# Patient Record
Sex: FEMALE | Race: WHITE
[De-identification: names, ages, dates, MRNs, and addresses within clinical notes are randomized per-mention and may not be internally consistent; named-entity substitution may affect disease eponyms.]

---

## 2017-07-18 ENCOUNTER — HOSPITAL ENCOUNTER (EMERGENCY)
Dept: HOSPITAL 11 - JP.ED | Age: 62
Discharge: HOME | End: 2017-07-18
Payer: MEDICARE

## 2017-07-18 VITALS — SYSTOLIC BLOOD PRESSURE: 149 MMHG | DIASTOLIC BLOOD PRESSURE: 78 MMHG

## 2017-07-18 DIAGNOSIS — Z98.890: ICD-10-CM

## 2017-07-18 DIAGNOSIS — S40.022A: Primary | ICD-10-CM

## 2017-07-18 DIAGNOSIS — Z79.899: ICD-10-CM

## 2017-07-18 DIAGNOSIS — F17.210: ICD-10-CM

## 2017-07-18 DIAGNOSIS — I10: ICD-10-CM

## 2017-07-18 DIAGNOSIS — Z88.5: ICD-10-CM

## 2017-07-18 DIAGNOSIS — Z98.51: ICD-10-CM

## 2017-07-18 DIAGNOSIS — Z90.49: ICD-10-CM

## 2017-07-18 DIAGNOSIS — J44.9: ICD-10-CM
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
## 2017-07-18 NOTE — EDM.PDOC
[id number]: REACTION TO BLOOD DRAW


Time Seen by Provider: 07/18/17 14:45


Source of Information: Reports: Patient


History Limitations: Reports: No Limitations





- History of Present Illness


INITIAL COMMENTS - FREE TEXT/NARRATIVE: 





61-year-old female had a blood draw from the left antecubital area yesterday, 

told to recheck if swelling occurs and she has a small amount of swelling so 

came in to have it looked at. She has a doctor's appointment tomorrow. No 

fevers or chills, small amount of soreness with movement of the left fingers 

radiating into the forearm.


Location: Reports: Upper Extremity, Left


Quality: Reports: Ache


Severity: Mild


Associated Symptoms: Reports: Other (Bruises easily, always has it is a chronic 

problem)


  ** left arm


Pain Score (Numeric/FACES): 8





- Related Data


 Allergies











Allergy/AdvReac Type Severity Reaction Status Date / Time


 


codeine Allergy Unknown Hives Verified 07/18/17 14:33











Home Meds: 


 Home Meds





Valsartan [Diovan] 160 mg PO DAILY 10/28/13 [History]


Omeprazole [Prilosec] 2 cap PO DAILY 12/23/15 [History]











Past Medical History


HEENT History: Reports: Other (See Below)


Other HEENT History: wears glasses


Cardiovascular History: Reports: Hypertension


Respiratory History: Reports: COPD


Gastrointestinal History: Reports: Cholelithiasis, GI Bleed


Other Gastrointestinal History: perforated ulcer 2013


OB/GYN History: Reports: Pregnancy


Musculoskeletal History: Reports: Back Pain, Chronic


Psychiatric History: Reports: Addiction


Hematologic History: Reports: Blood Transfusion(s)





- Infectious Disease History


Infectious Disease History: Reports: Chicken Pox, Measles, Mumps





- Past Surgical History


HEENT Surgical History: Reports: Oral Surgery


Cardiovascular Surgical History: Reports: None


Respiratory Surgical History: Reports: None


GI Surgical History: Reports: Cholecystectomy, Hernia, Abdominal


Female  Surgical History: Reports: Tubal Ligation


Musculoskeletal Surgical History: Reports: None





Social & Family History





- Family History


HEENT: Reports: Macular Degeneration


Other HEENT Family History: mother


Cardiac: Reports: Hypertension


Other Cardiac Family History: both parents had hypertension


Respiratory: Reports: COPD


Other Respiratory Family Hisory: father had emphysema


Endocrine/Metabolic: Reports: Diabetes, type II





- Tobacco Use


Smoking Status *Q: Current Every Day Smoker


Years of Tobacco use: 40


Packs/Tins Daily: 1


Used Tobacco, but Quit: No


Month Tobacco Last Used: last 6 days ago


Second Hand Smoke Exposure: Yes





- Alcohol Use


Days Per Week of Alcohol Use: 2


Number of Drinks Per Day: 5


Total Drinks Per Week: 10


Date of Last Drink: 06/27/17





- Recreational Drug Use


Recreational Drug Use: No





ED ROS GENERAL





- Review of Systems


Review Of Systems: See Below


Constitutional: Denies: Fever, Chills


Respiratory: Denies: Shortness of Breath


GI/Abdominal: Denies: Nausea, Vomiting


Skin: Reports: Bruising


Neurological: Reports: No Symptoms





ED EXAM, SKIN/RASH


Exam: See Below


Exam Limited By: No Limitations


General Appearance: Alert, No Apparent Distress


Respiratory/Chest: No Respiratory Distress, Lungs Clear


Cardiovascular: Regular Rate, Rhythm


Extremities: Other (Exam is otherwise limited to the left arm. The patient has 

a small bruise in the antecubital area with a small amount of swelling and 

tenderness. No limited range of motion)





Course





- Vital Signs


Last Recorded V/S: 


 Last Vital Signs











Temp  95.9 F   07/18/17 14:30


 


Pulse  100   07/18/17 14:30


 


Resp  18   07/18/17 14:30


 


BP  149/78 H  07/18/17 14:30


 


Pulse Ox  96   07/18/17 14:30














- Re-Assessments/Exams


Free Text/Narrative Re-Assessment/Exam: 





07/18/17 14:52


Patient was reassured that she has a small but not uncommon hematoma from the 

blood draw. I gave her a 2 inch Ace wrap to apply around the elbow for support, 

she did ice it yesterday and she can continue icing today. Recheck with her 

primary caretaker tomorrow as scheduled





Departure





- Departure


Time of Disposition: 15:02


Disposition: Home, Self-Care 01


Condition: Good


Clinical Impression: 


Traumatic hematoma of upper arm


Qualifiers:


 Encounter type: initial encounter Laterality: left Qualified Code(s): S40.022A 

- Contusion of left upper arm, initial encounter








- Discharge Information


Instructions:  Hematoma, Easy-to-Read


Referrals: 


Anna Alarcon MD [Primary Care Provider] - 


Forms:  ED Department Discharge


Care Plan Goals: 


Use Ace wrap to provide gentle pressure on the area and continue icing for 

another day. Recheck tomorrow as scheduled.

## 2018-03-01 ENCOUNTER — HOSPITAL ENCOUNTER (EMERGENCY)
Dept: HOSPITAL 11 - JP.ED | Age: 63
Discharge: HOME | End: 2018-03-01
Payer: MEDICARE

## 2018-03-01 VITALS — DIASTOLIC BLOOD PRESSURE: 81 MMHG | SYSTOLIC BLOOD PRESSURE: 152 MMHG

## 2018-03-01 DIAGNOSIS — I10: ICD-10-CM

## 2018-03-01 DIAGNOSIS — J44.9: ICD-10-CM

## 2018-03-01 DIAGNOSIS — Z88.8: ICD-10-CM

## 2018-03-01 DIAGNOSIS — Z88.5: ICD-10-CM

## 2018-03-01 DIAGNOSIS — Z79.899: ICD-10-CM

## 2018-03-01 DIAGNOSIS — S93.491A: Primary | ICD-10-CM
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
## 2018-03-01 DIAGNOSIS — F17.210: ICD-10-CM

## 2018-03-01 NOTE — EDM.PDOC
ED HPI GENERAL MEDICAL PROBLEM





- General


Chief Complaint: Lower Extremity Injury/Pain


Stated Complaint: ROLLED RIGHT ANKLE


Time Seen by Provider: 03/01/18 04:20


Source of Information: Reports: Patient


History Limitations: Reports: No Limitations





- History of Present Illness


INITIAL COMMENTS - FREE TEXT/NARRATIVE: 





62-year-old female who rolled her ankle a week ago, she iced it initially and 

it seemed to improve for a couple of days but over the last 4 days it's gotten 

worse, has swelled more and keeping her up at night. It's painful to bear 

weight.


Onset: Sudden


Duration: Week(s): (One week)


Location: Reports: Lower Extremity, Right


Severity: Mild


Associated Symptoms: Reports: No Other Symptoms


  ** right ankle


Pain Score (Numeric/FACES): 9





- Related Data


 Allergies











Allergy/AdvReac Type Severity Reaction Status Date / Time


 


codeine Allergy Unknown Hives Verified 07/18/17 14:33


 


cortisone Allergy  Hives Verified 03/01/18 04:10











Home Meds: 


 Home Meds





Valsartan [Diovan] 160 mg PO DAILY 10/28/13 [History]


Omeprazole [Prilosec] 1 cap PO DAILY 12/23/15 [History]











Past Medical History


HEENT History: Reports: Cataract, Other (See Below)


Other HEENT History: wears glasses


Cardiovascular History: Reports: Hypertension


Respiratory History: Reports: COPD


Gastrointestinal History: Reports: Cholelithiasis, GI Bleed


Other Gastrointestinal History: perforated ulcer 2013


OB/GYN History: Reports: Pregnancy


Musculoskeletal History: Reports: Arthritis, Back Pain, Chronic


Psychiatric History: Reports: Addiction


Hematologic History: Reports: Blood Transfusion(s)





- Infectious Disease History


Infectious Disease History: Reports: Chicken Pox, Measles, Mumps





- Past Surgical History


HEENT Surgical History: Reports: Cataract Surgery, Oral Surgery


GI Surgical History: Reports: Cholecystectomy, Hernia, Abdominal


Female  Surgical History: Reports: Tubal Ligation


Musculoskeletal Surgical History: Reports: Arthroscopic Knee





Social & Family History





- Family History


HEENT: Reports: Macular Degeneration


Other HEENT Family History: mother


Cardiac: Reports: Hypertension


Other Cardiac Family History: both parents had hypertension


Respiratory: Reports: COPD


Other Respiratory Family Hisory: father had emphysema


Endocrine/Metabolic: Reports: Diabetes, type II





- Tobacco Use


Smoking Status *Q: Current Every Day Smoker


Years of Tobacco use: 42


Packs/Tins Daily: 1


Used Tobacco, but Quit: No


Month Tobacco Last Used: last 6 days ago


Second Hand Smoke Exposure: Yes





- Caffeine Use


Caffeine Use: Reports: Coffee





- Alcohol Use


Days Per Week of Alcohol Use: 4


Number of Drinks Per Day: 3


Total Drinks Per Week: 12





- Recreational Drug Use


Recreational Drug Use: No





Review of Systems





- Review of Systems


Review Of Systems: See Below


Constitutional: Denies: Fever


Respiratory: Denies: Shortness of Breath (COPD is under good control)


Cardiovascular: Denies: Chest Pain


Genitourinary: Reports: No Symptoms


Skin: Denies: Bruising (Swelling around the ankle but no significant bruising)


Neurological: Reports: No Symptoms.  Denies: Headache





ED EXAM, GENERAL





- Physical Exam


Exam: See Below


Exam Limited By: No Limitations


General Appearance: Alert, No Apparent Distress


Respiratory/Chest: No Respiratory Distress


Cardiovascular: Regular Rate, Rhythm


Extremities: Other (Exam is otherwise limited to the lower extremities. The 

right ankle has significant swelling laterally, and the patient is very tender 

over the distal fibula. There is no peripheral edema of the lower legs 

bilaterally.)





Course





- Vital Signs


Last Recorded V/S: 


 Last Vital Signs











Temp  96.2 F   03/01/18 04:12


 


Pulse  88   03/01/18 04:12


 


Resp  18   03/01/18 04:12


 


BP  152/81 H  03/01/18 04:12


 


Pulse Ox  97   03/01/18 04:12














- Orders/Labs/Meds


Orders: 


 Active Orders 24 hr











 Category Date Time Status


 


 Ankle Min 3V Rt [CR] Stat Exams  03/01/18 04:23 Taken














- Re-Assessments/Exams


Free Text/Narrative Re-Assessment/Exam: 





03/01/18 04:32


An x-ray of the ankle was obtained.


03/01/18 04:44


X-ray showed osteoporosis and old arthritic changes but no acute fracture. A 

four-inch Ace wrap was applied to the foot and ankle and the patient was 

encouraged to increase activity as tolerated. She can see Dr. Arriaza on Monday 

if not improving satisfactorily.





Departure





- Departure


Time of Disposition: 05:00


Disposition: Home, Self-Care 01


Condition: Good


Clinical Impression: 


Right ankle sprain


Qualifiers:


 Encounter type: initial encounter Involved ligament of ankle: anterior 

talofibular ligament Qualified Code(s): S93.491A - Sprain of other ligament of 

right ankle, initial encounter








- Discharge Information


Instructions:  Ankle Sprain, Easy-to-Read


Referrals: 


Anna Alarcon MD [Primary Care Provider] - 


Forms:  ED Department Discharge


Care Plan Goals: 


Continue wrapping to reduce swelling and support ankle for the next 4-6 days. 

Increase activity as tolerated and a regular dose of ibuprofen or naproxen 

should help. Recheck at the clinic with Dr. Arriaza on Monday if not improving 

satisfactorily.





- My Orders


Last 24 Hours: 


My Active Orders





03/01/18 04:23


Ankle Min 3V Rt [CR] Stat 














- Assessment/Plan


Last 24 Hours: 


My Active Orders





03/01/18 04:23


Ankle Min 3V Rt [CR] Stat

## 2018-03-01 NOTE — CR
Diffuse osteopenia. Distal clavicular avulsion fragments or accessory ossicles may be remote. No defi
nitive acute fracture.

## 2018-03-07 ENCOUNTER — HOSPITAL ENCOUNTER (EMERGENCY)
Dept: HOSPITAL 11 - JP.ED | Age: 63
Discharge: HOME | End: 2018-03-07
Payer: MEDICARE

## 2018-03-07 VITALS — DIASTOLIC BLOOD PRESSURE: 89 MMHG | SYSTOLIC BLOOD PRESSURE: 162 MMHG

## 2018-03-07 DIAGNOSIS — F17.210: ICD-10-CM

## 2018-03-07 DIAGNOSIS — Z88.5: ICD-10-CM

## 2018-03-07 DIAGNOSIS — Z88.8: ICD-10-CM

## 2018-03-07 DIAGNOSIS — S96.911A: Primary | ICD-10-CM
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
## 2018-03-07 DIAGNOSIS — I10: ICD-10-CM

## 2018-03-07 DIAGNOSIS — Z79.899: ICD-10-CM

## 2018-03-07 PROCEDURE — 73630 X-RAY EXAM OF FOOT: CPT

## 2018-03-07 PROCEDURE — 99284 EMERGENCY DEPT VISIT MOD MDM: CPT

## 2018-03-07 PROCEDURE — 73610 X-RAY EXAM OF ANKLE: CPT

## 2018-03-07 NOTE — EDM.PDOC
ED HPI GENERAL MEDICAL PROBLEM





- General


Chief Complaint: Lower Extremity Injury/Pain


Stated Complaint: TWISTED LEFT ANKLE, REINJURED


Time Seen by Provider: 03/07/18 15:35


Source of Information: Reports: Patient, Old Records


History Limitations: Reports: No Limitations





- History of Present Illness


INITIAL COMMENTS - FREE TEXT/NARRATIVE: 





63 yo female here after slipping on the ice about 11 am today and re-injuring a 

foot/ankle that she injured and was seen here about 6 days ago. At that visit 

her X-ray showed no fx's, but did show osteoporosis. Says foot and ankle both 

hurt now. No tx prior to arrival. 


Onset: Today


Onset Date: 03/07/18


Onset Time: 11:00


Duration: Minutes:, Constant


Location: Reports: Lower Extremity, Right


Quality: Reports: Ache


Severity: Moderate


Improves with: Reports: Rest


Worsens with: Reports: Movement (or weight bearing)


Context: Reports: Trauma


Associated Symptoms: Reports: No Other Symptoms


Treatments PTA: Reports: Other (see below) (ACE wrap)


  ** RIGHT ANKLE


Pain Score (Numeric/FACES): 9





- Related Data


 Allergies











Allergy/AdvReac Type Severity Reaction Status Date / Time


 


codeine Allergy Unknown Hives Verified 03/07/18 15:11


 


cortisone Allergy  Hives Verified 03/07/18 15:11











Home Meds: 


 Home Meds





Valsartan [Diovan] 160 mg PO DAILY 10/28/13 [History]


Omeprazole [Prilosec] 1 cap PO DAILY 12/23/15 [History]











Past Medical History


HEENT History: Reports: Cataract, Other (See Below)


Other HEENT History: wears glasses


Cardiovascular History: Reports: Hypertension


Respiratory History: Reports: COPD


Gastrointestinal History: Reports: Cholelithiasis, GI Bleed


Other Gastrointestinal History: perforated ulcer 2013


OB/GYN History: Reports: Pregnancy


Musculoskeletal History: Reports: Arthritis, Back Pain, Chronic


Psychiatric History: Reports: Addiction


Hematologic History: Reports: Blood Transfusion(s)





- Infectious Disease History


Infectious Disease History: Reports: Chicken Pox, Measles, Mumps





- Past Surgical History


HEENT Surgical History: Reports: Cataract Surgery, Oral Surgery


Cardiovascular Surgical History: Reports: None


Respiratory Surgical History: Reports: None


GI Surgical History: Reports: Cholecystectomy, Hernia, Abdominal


Female  Surgical History: Reports: Tubal Ligation


Musculoskeletal Surgical History: Reports: Arthroscopic Knee





Social & Family History





- Family History


HEENT: Reports: Macular Degeneration


Other HEENT Family History: mother


Cardiac: Reports: Hypertension


Other Cardiac Family History: both parents had hypertension


Respiratory: Reports: COPD


Other Respiratory Family Hisory: father had emphysema


Endocrine/Metabolic: Reports: Diabetes, type II





- Tobacco Use


Smoking Status *Q: Current Every Day Smoker


Years of Tobacco use: 20


Packs/Tins Daily: 1


Used Tobacco, but Quit: No


Month Tobacco Last Used: last 6 days ago


Second Hand Smoke Exposure: Yes





- Caffeine Use


Caffeine Use: Reports: Coffee





- Alcohol Use


Days Per Week of Alcohol Use: 4


Number of Drinks Per Day: 2


Total Drinks Per Week: 8


Date of Last Drink: 03/07/18


Time of Last Drink: 13:00





- Recreational Drug Use


Recreational Drug Use: No





Review of Systems





- Review of Systems


Review Of Systems: See Below


Constitutional: Reports: No Symptoms


Musculoskeletal: Reports: Foot Pain, Joint Pain (R ankle)


Skin: Reports: No Symptoms


Neurological: Reports: No Symptoms





ED EXAM, GENERAL





- Physical Exam


Exam: See Below


Exam Limited By: No Limitations


General Appearance: Alert, WD/WN, No Apparent Distress


Extremities: Pedal Edema, Limited Range of Motion, Other (swelling mainly of 

the R foot, tenderness of both the dorsum of the R foot and also the ankle. )


Neurological: Alert, Oriented, CN II-XII Intact, Normal Cognition, No Motor/

Sensory Deficits


Psychiatric: Normal Affect, Normal Mood


Skin Exam: Warm, Dry, Intact, Normal Color, No Rash





Course





- Vital Signs


Text/Narrative:: 





Cam walker fitted by nursing.


Last Recorded V/S: 


 Last Vital Signs











Temp  36.2 C   03/07/18 15:12


 


Pulse  78   03/07/18 15:12


 


Resp  14   03/07/18 15:12


 


BP  162/89 H  03/07/18 15:12


 


Pulse Ox  99   03/07/18 15:12














- Orders/Labs/Meds


Orders: 


 Active Orders 24 hr











 Category Date Time Status


 


 Ankle Min 3V Rt [CR] Stat Exams  03/07/18 15:41 Taken


 


 Foot Comp Min 3V Rt [CR] Stat Exams  03/07/18 15:41 Taken











Meds: 


Medications














Discontinued Medications














Generic Name Dose Route Start Last Admin





  Trade Name Freq  PRN Reason Stop Dose Admin


 


Acetaminophen  650 mg  03/07/18 15:41  03/07/18 15:45





  Tylenol  PO  03/07/18 15:42  650 mg





  NOW ONE   Administration














- Radiology Interpretation


Free Text/Narrative:: 





X-ray of R foot and ankle-negative for acute fx's





Departure





- Departure


Time of Disposition: 16:11


Disposition: Home, Self-Care 01


Condition: Good


Clinical Impression: 


Strain of ankle and foot


Qualifiers:


 Encounter type: initial encounter Laterality: right Qualified Code(s): 

S96.911A - Strain of unspecified muscle and tendon at ankle and foot level, 

right foot, initial encounter








- Discharge Information


Referrals: 


Anna Alarcon MD [Primary Care Provider] - 


Forms:  ED Department Discharge





- My Orders


Last 24 Hours: 


My Active Orders





03/07/18 15:41


Ankle Min 3V Rt [CR] Stat 


Foot Comp Min 3V Rt [CR] Stat 














- Assessment/Plan


Last 24 Hours: 


My Active Orders





03/07/18 15:41


Ankle Min 3V Rt [CR] Stat 


Foot Comp Min 3V Rt [CR] Stat

## 2018-03-08 NOTE — CR
FOOT RIGHT 3 views

 

CLINICAL HISTORY:Pain, fall

 

FINDINGS:There are some lateral soft tissue swelling. No fractures identified. There is a small peria
rticular calcification in the calcaneocuboid junction similar to older study. This is likely ligament
ous.

 

Impression: Lateral soft tissue swelling

 

No fracture

## 2018-03-08 NOTE — CR
Ankle Min 2V Rt

 

CLINICAL HISTORY: Pain, fall

 

FINDINGS: The soft tissues are swollen over the lateral malleolus. No acute fracture or dislocation i
s noted. Ankle mortise is intact. There is a secondary ossification center off the tip of the fibula.


 

Impression: Normal soft tissue swelling

 

No fracture or dislocation

## 2018-08-18 ENCOUNTER — HOSPITAL ENCOUNTER (EMERGENCY)
Dept: HOSPITAL 11 - JP.ED | Age: 63
LOS: 1 days | Discharge: HOME | End: 2018-08-19
Payer: MEDICARE

## 2018-08-18 DIAGNOSIS — I10: ICD-10-CM

## 2018-08-18 DIAGNOSIS — Z88.8: ICD-10-CM

## 2018-08-18 DIAGNOSIS — S22.089A: Primary | ICD-10-CM

## 2018-08-18 DIAGNOSIS — F17.210: ICD-10-CM
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
## 2018-08-18 DIAGNOSIS — J44.9: ICD-10-CM

## 2018-08-18 DIAGNOSIS — Z88.5: ICD-10-CM

## 2018-08-18 PROCEDURE — 71046 X-RAY EXAM CHEST 2 VIEWS: CPT

## 2018-08-18 PROCEDURE — 96361 HYDRATE IV INFUSION ADD-ON: CPT

## 2018-08-18 PROCEDURE — 80053 COMPREHEN METABOLIC PANEL: CPT

## 2018-08-18 PROCEDURE — 85025 COMPLETE CBC W/AUTO DIFF WBC: CPT

## 2018-08-18 PROCEDURE — 36415 COLL VENOUS BLD VENIPUNCTURE: CPT

## 2018-08-18 PROCEDURE — 81001 URINALYSIS AUTO W/SCOPE: CPT

## 2018-08-18 PROCEDURE — 74177 CT ABD & PELVIS W/CONTRAST: CPT

## 2018-08-18 PROCEDURE — 82150 ASSAY OF AMYLASE: CPT

## 2018-08-18 PROCEDURE — 99285 EMERGENCY DEPT VISIT HI MDM: CPT

## 2018-08-18 PROCEDURE — 83690 ASSAY OF LIPASE: CPT

## 2018-08-18 PROCEDURE — 96374 THER/PROPH/DIAG INJ IV PUSH: CPT

## 2018-08-18 PROCEDURE — 83605 ASSAY OF LACTIC ACID: CPT

## 2018-08-18 PROCEDURE — 72020 X-RAY EXAM OF SPINE 1 VIEW: CPT

## 2018-08-18 PROCEDURE — 96375 TX/PRO/DX INJ NEW DRUG ADDON: CPT

## 2018-08-18 PROCEDURE — 86140 C-REACTIVE PROTEIN: CPT

## 2018-08-19 VITALS — DIASTOLIC BLOOD PRESSURE: 94 MMHG | SYSTOLIC BLOOD PRESSURE: 186 MMHG

## 2018-08-20 NOTE — CR
CHEST: 2 view

 

CLINICAL HISTORY:Compression fracture

 

COMPARISON:2014

 

FINDINGS:  Lungs are hyperaerated. Heart and pulmonary vascularity appear normal. There are atheroscl
erotic changes in the aorta. There is a compression deformity in the lower thoracic spine. This was n
ot present on 2014.

 

 

IMPRESSION:  Emphysematous changes

 

Interval compression deformity of a lower thoracic vertebrae when compared to 2014

## 2018-08-20 NOTE — CR
Thoracic Spine 1V

 

CLINICAL HISTORY: Compression fracture

 

FINDINGS: Lateral views of the thoracic spine show mild to moderate compression deformity of T12. Thi
s was not present on the 2014 chest x-ray. There is some generalized disc space narrowing with minima
l spondylosis.

 

Impression: Limited study

 

Mild to moderate compression deformity of T12 of uncertain chronology

 

Mild diffuse degenerative disc changes

## 2018-08-23 ENCOUNTER — HOSPITAL ENCOUNTER (OUTPATIENT)
Dept: HOSPITAL 11 - JP.SDS | Age: 63
Discharge: HOME | End: 2018-08-23
Attending: SURGERY
Payer: MEDICARE

## 2018-08-23 VITALS — DIASTOLIC BLOOD PRESSURE: 91 MMHG | SYSTOLIC BLOOD PRESSURE: 143 MMHG

## 2018-08-23 DIAGNOSIS — K25.9: ICD-10-CM

## 2018-08-23 DIAGNOSIS — Z88.8: ICD-10-CM

## 2018-08-23 DIAGNOSIS — F17.200: ICD-10-CM

## 2018-08-23 DIAGNOSIS — J44.9: ICD-10-CM

## 2018-08-23 DIAGNOSIS — Z79.899: ICD-10-CM

## 2018-08-23 DIAGNOSIS — Z88.6: ICD-10-CM

## 2018-08-23 DIAGNOSIS — Z88.5: ICD-10-CM

## 2018-08-23 DIAGNOSIS — R10.13: Primary | ICD-10-CM

## 2018-08-23 DIAGNOSIS — I10: ICD-10-CM

## 2018-08-23 DIAGNOSIS — E78.5: ICD-10-CM

## 2018-08-23 PROCEDURE — 43239 EGD BIOPSY SINGLE/MULTIPLE: CPT

## 2018-08-23 PROCEDURE — C9113 INJ PANTOPRAZOLE SODIUM, VIA: HCPCS

## 2018-08-23 PROCEDURE — 87081 CULTURE SCREEN ONLY: CPT

## 2018-09-03 NOTE — OR
DATE OF PROCEDURE:  08/23/2018

 

PREOPERATIVE DIAGNOSIS:  Epigastric pain with history of peptic ulcer disease.

 

POSTOPERATIVE DIAGNOSIS:  Large, deep pre-pyloric gastric ulcer.

 

OPERATIVE PROCEDURES:

1. Esophagogastroduodenoscopy with:

    a.     Biopsies of ulcer for histologic evaluation.

    b.     Biopsies of antrum for CLOtest.

 

ANESTHESIA:  IV sedation.

 

INDICATION FOR PROCEDURE:  The patient recently was seen in the emergency room with ongoing

epigastric pain.  She does have a history of peptic ulcer disease and is referred for upper

GI endoscopy.  She had been started on Protonix 40 mg a day 5 days ago and thinks she is

noting some improvement in her symptoms.  Plan is to proceed with upper GI endoscopy with

biopsies as indicated.  Potential risks including bleeding and perforation were discussed,

and the patient wishes to proceed.

 

DETAILS OF PROCEDURE:  The patient was taken to the operating room and placed in a left

lateral decubitus position.  IV sedation was administered, after which the upper GI

endoscope was passed orally through the length of the esophagus and into the stomach with

retroflexion view of the fundus, and thereafter through the pyloric channel and into the

proximal duodenum.

 

Findings included a normal hypopharynx, larynx, upper esophageal sphincter, and esophageal

body.  At the EG junction, no significant inflammation, no new stricturing was identified.

There did not appear to be any upward extension of the gastroesophageal junction mucosal

line above the upper gastric folds.

 

Within the stomach, there was a small amount of retained bile.  The fundus, cardia, and body

were unremarkable, however, as one passed into the antrum, the patient was noted to have

quite large and very deep prepyloric ulcer.  This had relatively smooth, i.e., not heaped up

edges and was located perhaps 1.5 cm proximal to the pylorus.  The pyloric sphincter itself

was patent and the remaining __________ exam was otherwise unremarkable.  At this point,

biopsies were obtained from the ulcer including the ulcer bed along the edges of the ulcer,

sent for histologic evaluation and to separately evaluate the patient's H. pylori status.

Some more normal-appearing antral mucosa was biopsied and sent for the CLOtest.  Minimal

bleeding from the biopsy sites was seen and the procedure was then concluded.  The patient

was taken to the recovery room in satisfactory condition.

 

Continue the present Protonix.  The patient will be set up to have a repeat upper endoscopy

in 4-5 weeks.  With a gastric ulcer, it is imperative that this be followed up until it is

entirely healed to rule out an underlying malignancy despite what may be benign findings on

initial biopsy.

 

 

 

 

Erick Jensen MD

DD:  09/02/2018 16:52:54

DT:  09/03/2018 10:27:46

Job #:  [id number]

## 2018-09-07 ENCOUNTER — HOSPITAL ENCOUNTER (OUTPATIENT)
Dept: HOSPITAL 11 - JP.SDS | Age: 63
Discharge: HOME | End: 2018-09-07
Attending: SURGERY
Payer: MEDICARE

## 2018-09-07 VITALS — DIASTOLIC BLOOD PRESSURE: 67 MMHG | SYSTOLIC BLOOD PRESSURE: 103 MMHG

## 2018-09-07 DIAGNOSIS — Z79.899: ICD-10-CM

## 2018-09-07 DIAGNOSIS — E78.5: ICD-10-CM

## 2018-09-07 DIAGNOSIS — K44.9: ICD-10-CM

## 2018-09-07 DIAGNOSIS — Z88.5: ICD-10-CM

## 2018-09-07 DIAGNOSIS — J44.9: ICD-10-CM

## 2018-09-07 DIAGNOSIS — I10: ICD-10-CM

## 2018-09-07 DIAGNOSIS — Z88.8: ICD-10-CM

## 2018-09-07 DIAGNOSIS — K25.9: Primary | ICD-10-CM

## 2018-09-07 DIAGNOSIS — Z88.6: ICD-10-CM

## 2018-09-07 DIAGNOSIS — F17.210: ICD-10-CM

## 2018-09-07 DIAGNOSIS — M19.90: ICD-10-CM

## 2018-09-07 PROCEDURE — 84100 ASSAY OF PHOSPHORUS: CPT

## 2018-09-07 PROCEDURE — 86900 BLOOD TYPING SEROLOGIC ABO: CPT

## 2018-09-07 PROCEDURE — 36415 COLL VENOUS BLD VENIPUNCTURE: CPT

## 2018-09-07 PROCEDURE — 86901 BLOOD TYPING SEROLOGIC RH(D): CPT

## 2018-09-07 PROCEDURE — 85027 COMPLETE CBC AUTOMATED: CPT

## 2018-09-07 PROCEDURE — 86850 RBC ANTIBODY SCREEN: CPT

## 2018-09-07 PROCEDURE — 80053 COMPREHEN METABOLIC PANEL: CPT

## 2018-09-07 PROCEDURE — 43239 EGD BIOPSY SINGLE/MULTIPLE: CPT

## 2018-09-07 PROCEDURE — 83735 ASSAY OF MAGNESIUM: CPT

## 2018-09-07 PROCEDURE — 87081 CULTURE SCREEN ONLY: CPT

## 2018-09-10 ENCOUNTER — HOSPITAL ENCOUNTER (INPATIENT)
Dept: HOSPITAL 11 - JP.MS | Age: 63
LOS: 5 days | Discharge: HOME | DRG: 328 | End: 2018-09-15
Attending: SURGERY | Admitting: SURGERY
Payer: MEDICARE

## 2018-09-10 DIAGNOSIS — K21.9: ICD-10-CM

## 2018-09-10 DIAGNOSIS — K66.0: ICD-10-CM

## 2018-09-10 DIAGNOSIS — F10.11: ICD-10-CM

## 2018-09-10 DIAGNOSIS — D64.9: ICD-10-CM

## 2018-09-10 DIAGNOSIS — J45.909: ICD-10-CM

## 2018-09-10 DIAGNOSIS — K25.9: Primary | ICD-10-CM

## 2018-09-10 DIAGNOSIS — I10: ICD-10-CM

## 2018-09-10 DIAGNOSIS — S22.080D: ICD-10-CM

## 2018-09-10 DIAGNOSIS — Z87.19: ICD-10-CM

## 2018-09-10 DIAGNOSIS — S42.032D: ICD-10-CM
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
## 2018-09-10 DIAGNOSIS — Z48.1: ICD-10-CM

## 2018-09-10 DIAGNOSIS — F17.210: ICD-10-CM

## 2018-09-10 PROCEDURE — P9047 ALBUMIN (HUMAN), 25%, 50ML: HCPCS

## 2018-09-10 PROCEDURE — 0DB60ZZ EXCISION OF STOMACH, OPEN APPROACH: ICD-10-PCS | Performed by: SURGERY

## 2018-09-10 PROCEDURE — 3E0M05Z INTRODUCTION OF ADHESION BARRIER INTO PERITONEAL CAVITY, OPEN APPROACH: ICD-10-PCS | Performed by: SURGERY

## 2018-09-10 PROCEDURE — 008Q0ZZ DIVISION OF VAGUS NERVE, OPEN APPROACH: ICD-10-PCS | Performed by: SURGERY

## 2018-09-10 PROCEDURE — P9016 RBC LEUKOCYTES REDUCED: HCPCS

## 2018-09-10 PROCEDURE — 0DQ80ZZ REPAIR SMALL INTESTINE, OPEN APPROACH: ICD-10-PCS | Performed by: SURGERY

## 2018-09-10 PROCEDURE — 0DB80ZX EXCISION OF SMALL INTESTINE, OPEN APPROACH, DIAGNOSTIC: ICD-10-PCS | Performed by: SURGERY

## 2018-09-10 PROCEDURE — 0D160ZA BYPASS STOMACH TO JEJUNUM, OPEN APPROACH: ICD-10-PCS | Performed by: SURGERY

## 2018-09-10 PROCEDURE — G0480 DRUG TEST DEF 1-7 CLASSES: HCPCS

## 2018-09-10 PROCEDURE — 05HY33Z INSERTION OF INFUSION DEVICE INTO UPPER VEIN, PERCUTANEOUS APPROACH: ICD-10-PCS | Performed by: SURGERY

## 2018-09-10 PROCEDURE — 0DH60UZ INSERTION OF FEEDING DEVICE INTO STOMACH, OPEN APPROACH: ICD-10-PCS | Performed by: SURGERY

## 2018-09-10 PROCEDURE — 0DQL0ZZ REPAIR TRANSVERSE COLON, OPEN APPROACH: ICD-10-PCS | Performed by: SURGERY

## 2018-09-10 PROCEDURE — C9113 INJ PANTOPRAZOLE SODIUM, VIA: HCPCS

## 2018-09-10 RX ADMIN — ONDANSETRON PRN MG: 2 INJECTION, SOLUTION INTRAMUSCULAR; INTRAVENOUS at 22:41

## 2018-09-10 RX ADMIN — MOMETASONE FUROATE AND FORMOTEROL FUMARATE DIHYDRATE SCH PUFF: 200; 5 AEROSOL RESPIRATORY (INHALATION) at 22:53

## 2018-09-10 RX ADMIN — HEPARIN SODIUM (PORCINE) LOCK FLUSH IV SOLN 100 UNIT/ML PRN UNITS: 100 SOLUTION at 18:32

## 2018-09-10 RX ADMIN — ONDANSETRON PRN MG: 2 INJECTION, SOLUTION INTRAMUSCULAR; INTRAVENOUS at 16:21

## 2018-09-11 PROCEDURE — 30233N1 TRANSFUSION OF NONAUTOLOGOUS RED BLOOD CELLS INTO PERIPHERAL VEIN, PERCUTANEOUS APPROACH: ICD-10-PCS | Performed by: SURGERY

## 2018-09-11 RX ADMIN — MAGNESIUM SULFATE IN WATER SCH MLS/HR: 40 INJECTION, SOLUTION INTRAVENOUS at 21:05

## 2018-09-11 RX ADMIN — MAGNESIUM SULFATE IN WATER SCH MLS/HR: 40 INJECTION, SOLUTION INTRAVENOUS at 11:50

## 2018-09-11 RX ADMIN — MOMETASONE FUROATE AND FORMOTEROL FUMARATE DIHYDRATE SCH PUFF: 200; 5 AEROSOL RESPIRATORY (INHALATION) at 21:03

## 2018-09-11 RX ADMIN — MOMETASONE FUROATE AND FORMOTEROL FUMARATE DIHYDRATE SCH PUFF: 200; 5 AEROSOL RESPIRATORY (INHALATION) at 07:10

## 2018-09-11 RX ADMIN — MAGNESIUM SULFATE IN WATER SCH MLS/HR: 40 INJECTION, SOLUTION INTRAVENOUS at 15:21

## 2018-09-11 RX ADMIN — HEPARIN SODIUM (PORCINE) LOCK FLUSH IV SOLN 100 UNIT/ML PRN UNITS: 100 SOLUTION at 12:36

## 2018-09-11 RX ADMIN — HEPARIN SODIUM (PORCINE) LOCK FLUSH IV SOLN 100 UNIT/ML PRN UNITS: 100 SOLUTION at 05:19

## 2018-09-11 NOTE — PN
DATE OF SERVICE:  09/11/2018

 

SUBJECTIVE:  Maureen is postoperative day #1.  Temp max 99.9.  She had an episode of bleeding

around 2000 hours.  On operative day, hemoglobin did go down to 6.1.  Her dressings were

saturated and ANTONINO drain put out red drainage.  She received 2 units of packed red blood cells

and tranexamic acid.  Hemoglobin this a.m. is 11.  Magnesium 1.3 and albumin 1.8.  Maureen

reports pain is controlled.  She states she is tired and weak.

 

REVIEW OF SYSTEMS:  Remainder of review of systems negative for any pertinent positives and

negatives.

 

OBJECTIVE:  GENERAL:  Maureen Valdez is a 62-year-old female.

VITAL SIGNS:  TPR is 99.4, 110, 16, and blood pressure 118/78.

HEENT:  Negative.

NECK:  Supple.

HEART:  Regular rate and rhythm.

LUNGS:  Clear.

ABDOMEN:  Dressings are dry and intact.  Abdominal binder is on.  ANTONINO drain is draining a

light red drainage, total 515 over the past 24 hours.  Cooper catheter 550 and gastric tube

350.

EXTREMITIES:  Without peripheral edema and SCDs are on.

 

ASSESSMENT:  Open laparotomy with distal gastrectomy, small bowel stricturoplasty, placement

of Interceed mesh, and insertion of central vein catheter.  Date of surgery, 09/10/2018.

Surgeon, Erick Jensen MD.

 

PLAN:

1. Leave Cooper catheter in for accurate intake and output.

2. TPN therapy.  See copy of orders per Erick Jensen MD.  Faxed to Pharmacy.

3. Check CBC at 1700 hours today.

4. Check CBC, CMP, and phos in a.m.

5. Magnesium 2 grams IV q.4 hours x72 hours.

6. Albumin 25%, 25 grams daily x4 days.

7. Schedule and have consent signed for delayed primary closure, IV local sedation with

    TAP block, for 09/12/2018, Erick Jensen MD.

8. TAP block ordered through Pharmacy.

9. D5LR to TKO when TPN is started.

10.Good pulmonary toilet.

11.We will evaluate p.r.n. or in a.m.

 

 

 

 

Laura Montoya PA-C

DD:  09/11/2018 09:05:34

DT:  09/11/2018 10:16:32

Job #:  [id number]

## 2018-09-12 PROCEDURE — 30233N1 TRANSFUSION OF NONAUTOLOGOUS RED BLOOD CELLS INTO PERIPHERAL VEIN, PERCUTANEOUS APPROACH: ICD-10-PCS | Performed by: SURGERY

## 2018-09-12 PROCEDURE — 0WQF0ZZ REPAIR ABDOMINAL WALL, OPEN APPROACH: ICD-10-PCS | Performed by: SURGERY

## 2018-09-12 RX ADMIN — MOMETASONE FUROATE AND FORMOTEROL FUMARATE DIHYDRATE SCH: 200; 5 AEROSOL RESPIRATORY (INHALATION) at 07:25

## 2018-09-12 RX ADMIN — MAGNESIUM SULFATE IN WATER SCH MLS/HR: 40 INJECTION, SOLUTION INTRAVENOUS at 03:29

## 2018-09-12 RX ADMIN — MAGNESIUM SULFATE IN WATER SCH MLS/HR: 40 INJECTION, SOLUTION INTRAVENOUS at 15:12

## 2018-09-12 RX ADMIN — LEUCINE, PHENYLALANINE, LYSINE, METHIONINE, ISOLEUCINE, VALINE, HISTIDINE, THREONINE, TRYPTOPHAN, ALANINE, GLYCINE, ARGININE, PROLINE, SERINE, TYROSINE, SODIUM ACETATE, DIBASIC POTASSIUM PHOSPHATE, MAGNESIUM CHLORIDE, SODIUM CHLORIDE, CALCIUM CHLORIDE, DEXTROSE SCH MLS/HR
365; 280; 290; 200; 300; 290; 240; 210; 90; 1035; 515; 575; 340; 250; 20; 340; 261; 51; 59; 33; 15 INJECTION INTRAVENOUS at 22:12

## 2018-09-12 RX ADMIN — MAGNESIUM SULFATE IN WATER SCH MLS/HR: 40 INJECTION, SOLUTION INTRAVENOUS at 22:14

## 2018-09-12 RX ADMIN — HEPARIN SODIUM (PORCINE) LOCK FLUSH IV SOLN 100 UNIT/ML PRN UNITS: 100 SOLUTION at 04:25

## 2018-09-12 RX ADMIN — HEPARIN SODIUM (PORCINE) LOCK FLUSH IV SOLN 100 UNIT/ML PRN UNITS: 100 SOLUTION at 16:21

## 2018-09-12 RX ADMIN — MOMETASONE FUROATE AND FORMOTEROL FUMARATE DIHYDRATE SCH PUFF: 200; 5 AEROSOL RESPIRATORY (INHALATION) at 20:00

## 2018-09-12 RX ADMIN — MAGNESIUM SULFATE IN WATER SCH MLS/HR: 40 INJECTION, SOLUTION INTRAVENOUS at 09:35

## 2018-09-12 RX ADMIN — LEUCINE, PHENYLALANINE, LYSINE, METHIONINE, ISOLEUCINE, VALINE, HISTIDINE, THREONINE, TRYPTOPHAN, ALANINE, GLYCINE, ARGININE, PROLINE, SERINE, TYROSINE, SODIUM ACETATE, DIBASIC POTASSIUM PHOSPHATE, MAGNESIUM CHLORIDE, SODIUM CHLORIDE, CALCIUM CHLORIDE, DEXTROSE SCH MLS/HR
365; 280; 290; 200; 300; 290; 240; 210; 90; 1035; 515; 575; 340; 250; 20; 340; 261; 51; 59; 33; 15 INJECTION INTRAVENOUS at 09:32

## 2018-09-12 NOTE — OR
DATE OF PROCEDURE:  09/12/2018

 

PREOPERATIVE DIAGNOSIS:  Open abdominal incision.

 

POSTOPERATIVE DIAGNOSIS:  Open abdominal incision.

 

OPERATIVE PROCEDURE:  Delayed primary closure of open abdominal incision.

 

ANESTHESIA:  Local plus IV sedation.

 

INDICATION FOR PROCEDURE:  The patient is status post an open gastrectomy with Niki-en-Y

reconstruction.  Initially, she was felt to be at high risk for wound infection if a primary

closure was undertaken.  Given this, the incision was left open for a planned delayed

primary closure at this time.  Potential risks of the procedure including bleeding and

perforation were discussed, and the patient wishes to proceed.

 

DETAILS OF PROCEDURE:  The patient was taken to the operative room and placed in a supine

position.  IV sedation was administered, after which the operative dressing was taken down.

The wound was inspected and found to be clean.  The abdomen was then prepped and draped.

With continuous ultrasound guidance, bilateral subcostal transversus abdominis plane blocks

were placed.  Following this, the incision was anesthetized with 1% lidocaine mixed with

Marcaine and irrigated with meropenem-containing saline solution.  The long upper midline

incision was then closed with a layer of 3-0 Vicryl stitch deep and staples for the skin.

Dressing was applied.  The patient was taken to the recovery room in a satisfactory

condition.

 

 

 

 

Erick Jensen MD

DD:  09/12/2018 08:04:39

DT:  09/12/2018 14:05:31

Job #:  [id number]

## 2018-09-12 NOTE — PN
DATE OF SERVICE:  09/12/2018

 

SUBJECTIVE:  She remains to be n.p.o.  She will be having delayed primary closure this

morning.  Vital signs have been stable.  G-tube is intact and has put out 50 mL of a light

tan drainage.  ANTONINO drain put out 110 mL of a red drainage.  Hemoglobin this morning was 7.5.

 

REVIEW OF SYSTEMS:  Remainder of review of systems negative for any pertinent positives and

negatives.

 

OBJECTIVE:  GENERAL:  Maureen Valdez is a 62-year-old female.  She is alert and orientated.

VITAL SIGNS:  TPR 98.2, 104, 12.  Blood pressure 130/64.

HEENT:  Negative.

NECK:  Supple.

HEART:  Regular rate and rhythm.

LUNGS:  Clear.

ABDOMEN:  Dressings dry and intact.  Abdominal binder is on.

EXTREMITIES:  Without peripheral edema.  SCDs are on.

 

ASSESSMENT:

1. Open laparotomy with distal gastrectomy.  Small bowel strictureplasty, placement of

    Interceed mesh and insertion of central vein catheter.  Date of surgery 09/10/2018.

    Surgeon, Erick Jensen MD.

2. Delayed primary closure.

 

PLAN:

1. Orders to be written post delayed primary closure.

2. Good pulmonary toilet.

3. We will evaluate p.r.n. or in a.m.

 

 

 

 

Laura Montoya PA-C

DD:  09/12/2018 08:32:11

DT:  09/12/2018 09:33:59

Job #:  [id number]

## 2018-09-12 NOTE — PN
DATE OF SERVICE:  09/12/2018

 

SUBJECTIVE:  The patient has been afebrile with stable vital signs.  Urine output has been

quite high.  Hemoglobin is down to 7.5.  I think that is primary delusional.  We will give

her 1 unit of packed RBCs today __________ and she will undergo a delayed primary closure of

abdominal incision today.  We will leave the epidural catheter in for another day or so.

 

 

 

 

Erick Jensen MD

DD:  09/12/2018 08:03:18

DT:  09/12/2018 14:21:16

Job #:  [id number]

## 2018-09-13 RX ADMIN — MAGNESIUM SULFATE IN WATER SCH MLS/HR: 40 INJECTION, SOLUTION INTRAVENOUS at 11:13

## 2018-09-13 RX ADMIN — MOMETASONE FUROATE AND FORMOTEROL FUMARATE DIHYDRATE SCH PUFF: 200; 5 AEROSOL RESPIRATORY (INHALATION) at 07:30

## 2018-09-13 RX ADMIN — MAGNESIUM SULFATE IN WATER SCH MLS/HR: 40 INJECTION, SOLUTION INTRAVENOUS at 21:07

## 2018-09-13 RX ADMIN — LEUCINE, PHENYLALANINE, LYSINE, METHIONINE, ISOLEUCINE, VALINE, HISTIDINE, THREONINE, TRYPTOPHAN, ALANINE, GLYCINE, ARGININE, PROLINE, SERINE, TYROSINE, SODIUM ACETATE, DIBASIC POTASSIUM PHOSPHATE, MAGNESIUM CHLORIDE, SODIUM CHLORIDE, CALCIUM CHLORIDE, DEXTROSE SCH MLS/HR
365; 280; 290; 200; 300; 290; 240; 210; 90; 1035; 515; 575; 340; 250; 20; 340; 261; 51; 59; 33; 15 INJECTION INTRAVENOUS at 11:14

## 2018-09-13 RX ADMIN — MAGNESIUM SULFATE IN WATER SCH MLS/HR: 40 INJECTION, SOLUTION INTRAVENOUS at 16:03

## 2018-09-13 RX ADMIN — HEPARIN SODIUM (PORCINE) LOCK FLUSH IV SOLN 100 UNIT/ML PRN UNITS: 100 SOLUTION at 12:07

## 2018-09-13 RX ADMIN — LIDOCAINE HYDROCHLORIDE SCH MLS/HR: 10 INJECTION, SOLUTION INFILTRATION; PERINEURAL at 13:59

## 2018-09-13 RX ADMIN — LEUCINE, PHENYLALANINE, LYSINE, METHIONINE, ISOLEUCINE, VALINE, HISTIDINE, THREONINE, TRYPTOPHAN, ALANINE, GLYCINE, ARGININE, PROLINE, SERINE, TYROSINE, SODIUM ACETATE, DIBASIC POTASSIUM PHOSPHATE, MAGNESIUM CHLORIDE, SODIUM CHLORIDE, CALCIUM CHLORIDE, DEXTROSE SCH
365; 280; 290; 200; 300; 290; 240; 210; 90; 1035; 515; 575; 340; 250; 20; 340; 261; 51; 59; 33; 15 INJECTION INTRAVENOUS at 13:33

## 2018-09-13 RX ADMIN — MOMETASONE FUROATE AND FORMOTEROL FUMARATE DIHYDRATE SCH PUFF: 200; 5 AEROSOL RESPIRATORY (INHALATION) at 21:07

## 2018-09-13 RX ADMIN — LIDOCAINE HYDROCHLORIDE SCH MLS/HR: 10 INJECTION, SOLUTION INFILTRATION; PERINEURAL at 12:06

## 2018-09-13 RX ADMIN — LIDOCAINE HYDROCHLORIDE SCH MLS/HR: 10 INJECTION, SOLUTION INFILTRATION; PERINEURAL at 09:55

## 2018-09-13 RX ADMIN — MAGNESIUM SULFATE IN WATER SCH MLS/HR: 40 INJECTION, SOLUTION INTRAVENOUS at 03:15

## 2018-09-13 NOTE — PN
DATE OF SERVICE:  09/13/2018

 

SUBJECTIVE:  Maureen has been urinating small amounts throughout the night.  Report was that

her pain is controlled.  She is not sleeping, increased anxiety.  Oral intake 280, urine

output 3850.  She does express that she does want to go home and she also expressed desire

to go out and smoke.

 

REVIEW OF SYSTEMS:  Remainder of review of systems negative for any pertinent positives and

negatives.

 

OBJECTIVE:  GENERAL:  Maureen Valdez is a 62-year-old female, quite emotional today.

VITAL SIGNS:  TPR is 97.2, 96, 18, and blood pressure 142/72.

HEENT:  Negative.

NECK:  Supple.

HEART:  Regular rate and rhythm.

LUNGS:  Clear.

ABDOMEN:  Dressings dry and intact.  Abdominal binder is on.  ANTONINO drain put out 275 mL of a

light red drainage.

EXTREMITIES:  Without peripheral edema.

 

LABORATORY DATA:  Hemoglobin this morning is 9.1 after receiving 1 unit of packed red blood

cells yesterday.  Potassium is 3.4.

 

ASSESSMENT:

1. Open laparotomy, distal gastrectomy, small bowel strictureplasty, placement of

    Interceed mesh, and insertion of central vein catheter.  Date of surgery 09/10/2018.

    Surgeon, Erick Jensen MD.

2. Delayed primary closure, 09/12/2018.

 

PLAN:

1. Continue same TPN rate and content as 75 mL per hour.

2. Bladder scan after the patient voided and she had 140 mL postvoid.

3. Full liquid diet.

4. Clamp gastrostomy tube for 4 hours, unclamp 1 hour and unclamp if any nausea, vomiting,

    or abdominal distention.

5. Increase nicotine patch to 14 mg.

6. Discontinue scopolamine patch.

7. Check CBC, CMP, and phos in a.m.

8. KCl 60 mEq IV today.

9. Flomax 0.4 mg b.i.d. today, then every day after that post void bladder scan at 1400 to

    avoid putting back that Cooper catheter.

10.Good pulmonary toilet.

11.We will evaluate p.r.n. or in a.m.

 

 

Laura Montoya PA-C

DD:  09/13/2018 08:25:00

DT:  09/13/2018 10:43:41

Job #:  [id number]

## 2018-09-13 NOTE — CR
UGI wo KUB

 

HISTORY: gastrectomy-

 

FINDINGS: Limited upper GI series was obtained without fluoroscopy. Water-soluble contrast was admini
stered orally. Immediate along with 15 minute delayed images were obtained. Partial gastrectomy murphy
es are noted. Contrast passes readily into the jejunum. No obstruction is identified. There is no con
trast extravasation. Midline skin staples are noted.

 

There is interstitial prominence in the lower chest more prominent centrally than peripherally. Blunt
ing of the costophrenic angles bilaterally suggests a small amount of pleural fluid. Heart size appea
rs within normal limits.

 

IMPRESSION: 

1. No postoperative complication identified status post partial gastrectomy.

2. Interstitial prominence in the chest more prominent centrally than peripherally. Possible small bi
lateral pleural effusions. Recommend clinical correlation for possible pulmonary congestive changes o
r pulmonary edema. Inflammatory infiltrates are felt to be less likely but are not entirely excluded.

## 2018-09-14 RX ADMIN — MAGNESIUM SULFATE IN WATER SCH MLS/HR: 40 INJECTION, SOLUTION INTRAVENOUS at 03:21

## 2018-09-14 RX ADMIN — MOMETASONE FUROATE AND FORMOTEROL FUMARATE DIHYDRATE SCH PUFF: 200; 5 AEROSOL RESPIRATORY (INHALATION) at 20:44

## 2018-09-14 RX ADMIN — HEPARIN SODIUM (PORCINE) LOCK FLUSH IV SOLN 100 UNIT/ML PRN UNITS: 100 SOLUTION at 08:30

## 2018-09-14 RX ADMIN — OXYCODONE HYDROCHLORIDE AND ACETAMINOPHEN PRN TAB: 5; 325 TABLET ORAL at 13:24

## 2018-09-14 RX ADMIN — MOMETASONE FUROATE AND FORMOTEROL FUMARATE DIHYDRATE SCH PUFF: 200; 5 AEROSOL RESPIRATORY (INHALATION) at 07:25

## 2018-09-14 RX ADMIN — OXYCODONE HYDROCHLORIDE AND ACETAMINOPHEN PRN TAB: 5; 325 TABLET ORAL at 18:32

## 2018-09-14 RX ADMIN — OXYCODONE HYDROCHLORIDE AND ACETAMINOPHEN PRN TAB: 5; 325 TABLET ORAL at 08:01

## 2018-09-14 NOTE — PN
DATE OF SERVICE:  09/14/2018

 

SUBJECTIVE:  Maureen reports her pain has been controlled.  She has refused to walk due to

back pain.  Her last bag is almost in.  She feels like the Percocet, which she takes at home

works better than the Dilaudid she is getting in the hospital.  She has been passing flatus

but has not had a bowel movement.  G-tube output was recorded once yesterday and it had 300

mL.

 

REVIEW OF SYSTEMS:  Remainder of review of systems negative for any pertinent positives or

negatives.

 

OBJECTIVE:  GENERAL:  Maureen Valdez is a 62-year-old female.  She is alert and orientated.

VITAL SIGNS:  TPR is 100.4, 161 and checked again at 103, respirations are 18, blood

pressure is 161/72.

HEENT:  Negative.

NECK:  Supple.

HEART:  Regular rate and rhythm.

LUNGS:  Clear.

ABDOMEN:  Dressings dry and intact.  G-tube in place currently, and the ANTONINO drain is intact.

EXTREMITIES:  Without peripheral edema.

 

ASSESSMENT:

1. Open laparotomy, distal gastrectomy, small-bowel strictureplasty, placement of

    Interceed mesh, and insertion of central vein catheter.  Date of surgery 09/10/2018.

    Surgeon, Erick Jensen MD.

2. Delayed primary closure, 09/12/2018.

 

PLAN:

1. Discontinue Dilaudid.

2. Percocet 5/325 mg 1 to 2 every 4 hours p.r.n. pain.

3. Physical Therapy evaluation and treatment for chronic back pain.

4. Discontinue TPN when bag is done.

5. Mag citrate to give 1 bottle through G-tube after the G-tube is clamped.

6. Check CBC, CMP, mag, and phos.

7. Good pulmonary toilet.

8. We will evaluate p.r.n. or in a.m.

9. Plan discharge in a.m.

 

 

 

 

Laura Montoya PA-C

DD:  09/14/2018 08:44:32

DT:  09/14/2018 11:09:03

Job #:  [id number]

## 2018-09-15 VITALS — DIASTOLIC BLOOD PRESSURE: 65 MMHG | SYSTOLIC BLOOD PRESSURE: 143 MMHG

## 2018-09-15 RX ADMIN — OXYCODONE HYDROCHLORIDE AND ACETAMINOPHEN PRN TAB: 5; 325 TABLET ORAL at 04:08

## 2018-09-15 RX ADMIN — MOMETASONE FUROATE AND FORMOTEROL FUMARATE DIHYDRATE SCH PUFF: 200; 5 AEROSOL RESPIRATORY (INHALATION) at 07:08

## 2018-09-15 RX ADMIN — OXYCODONE HYDROCHLORIDE AND ACETAMINOPHEN PRN TAB: 5; 325 TABLET ORAL at 00:05

## 2018-09-15 RX ADMIN — OXYCODONE HYDROCHLORIDE AND ACETAMINOPHEN PRN TAB: 5; 325 TABLET ORAL at 08:08

## 2018-09-17 NOTE — OR
DATE OF PROCEDURE:  09/07/2018

 

PREOPERATIVE DIAGNOSIS:  Persistent pain consistent with a gastric ulcer.

 

POSTOPERATIVE DIAGNOSIS:  Persistent deep pre-pyloric gastric ulcer.

 

OPERATIVE PROCEDURES:  Esophagogastroduodenoscopy with biopsies of antrum for CLOtest.

 

ANESTHESIA:  IV sedation.

 

INDICATION FOR PROCEDURE:  This is a 62-year-old with history of previous perforated

duodenal ulcer, recently noted to have a deep pre-pyloric gastric ulcer.  Biopsy of this had

been negative for either H. pylori or any evidence of malignancy.  The patient remains quite

severely symptomatic despite ongoing medical treatment and is to undergo a repeat upper

endoscopy at this time to establish whether or not we are seeing any healing of the ulcer.

Potential risks of the procedure including bleeding and perforation were discussed, and the

patient wishes to proceed.

 

DETAILS OF PROCEDURE:  The patient was taken to the operating room and placed in a supine

position.  After IV sedation was administered, and she had been placed in a left lateral

decubitus position, the upper GI endoscope was passed orally through the length of the

esophagus and into the stomach with retroflexion view of the fundus, thereafter through the

pyloric channel and roughly to the level of the third and fourth portions of the duodenum.

 

Findings included normal hypopharynx, larynx, upper esophageal sphincter, and esophageal

body.  The EG junction had minimal inflammation.  There was a small hiatal hernia.  As one

looked toward the antrum, the patient was noted to still have a very deep prepyloric ulcer.

This had not significantly improved in terms of width or depth since the previous

examination.  Biopsies of the antrum adjacent to it for CLOtest was obtained, but the

visualized pylorus and duodenum were unremarkable.  At this point, the scope was withdrawn.

As discussed with the patient and her , if we are not seeing any healing of this with

her history of previous ulcers and ongoing pain, we will probably proceed with a distal

gastrectomy.  This would also make sure that we are not dealing with any underlying

malignancy.

 

 

 

 

Erick Jensen MD

DD:  09/14/2018 07:20:12

DT:  09/14/2018 13:13:14

Job #:  [id number]

## 2018-09-18 ENCOUNTER — HOSPITAL ENCOUNTER (INPATIENT)
Dept: HOSPITAL 11 - JP.2SS | Age: 63
LOS: 8 days | Discharge: HOME HEALTH | DRG: 641 | End: 2018-09-26
Attending: SURGERY | Admitting: SURGERY
Payer: MEDICARE

## 2018-09-18 DIAGNOSIS — G89.29: ICD-10-CM

## 2018-09-18 DIAGNOSIS — J44.9: ICD-10-CM

## 2018-09-18 DIAGNOSIS — Z98.890: ICD-10-CM

## 2018-09-18 DIAGNOSIS — Z87.11: ICD-10-CM

## 2018-09-18 DIAGNOSIS — E46: ICD-10-CM

## 2018-09-18 DIAGNOSIS — F10.11: ICD-10-CM

## 2018-09-18 DIAGNOSIS — Z88.5: ICD-10-CM

## 2018-09-18 DIAGNOSIS — M54.9: ICD-10-CM

## 2018-09-18 DIAGNOSIS — S42.032D: ICD-10-CM

## 2018-09-18 DIAGNOSIS — Z90.49: ICD-10-CM

## 2018-09-18 DIAGNOSIS — E87.2: ICD-10-CM

## 2018-09-18 DIAGNOSIS — Z90.3: ICD-10-CM

## 2018-09-18 DIAGNOSIS — R10.9: ICD-10-CM

## 2018-09-18 DIAGNOSIS — E86.0: Primary | ICD-10-CM

## 2018-09-18 DIAGNOSIS — Z93.1: ICD-10-CM

## 2018-09-18 DIAGNOSIS — D64.9: ICD-10-CM

## 2018-09-18 DIAGNOSIS — F17.210: ICD-10-CM

## 2018-09-18 DIAGNOSIS — E78.5: ICD-10-CM

## 2018-09-18 DIAGNOSIS — I10: ICD-10-CM

## 2018-09-18 DIAGNOSIS — Z88.8: ICD-10-CM

## 2018-09-18 PROCEDURE — C9113 INJ PANTOPRAZOLE SODIUM, VIA: HCPCS

## 2018-09-18 PROCEDURE — P9016 RBC LEUKOCYTES REDUCED: HCPCS

## 2018-09-18 RX ADMIN — LEUCINE, PHENYLALANINE, LYSINE, METHIONINE, ISOLEUCINE, VALINE, HISTIDINE, THREONINE, TRYPTOPHAN, ALANINE, GLYCINE, ARGININE, PROLINE, SERINE, TYROSINE, SODIUM ACETATE, DIBASIC POTASSIUM PHOSPHATE, MAGNESIUM CHLORIDE, SODIUM CHLORIDE, CALCIUM CHLORIDE, DEXTROSE SCH MLS/HR
365; 280; 290; 200; 300; 290; 240; 210; 90; 1035; 515; 575; 340; 250; 20; 340; 261; 51; 59; 33; 15 INJECTION INTRAVENOUS at 15:53

## 2018-09-18 RX ADMIN — HEPARIN SODIUM (PORCINE) LOCK FLUSH IV SOLN 100 UNIT/ML PRN UNITS: 100 SOLUTION at 10:54

## 2018-09-18 RX ADMIN — MOMETASONE FUROATE AND FORMOTEROL FUMARATE DIHYDRATE SCH PUFF: 200; 5 AEROSOL RESPIRATORY (INHALATION) at 20:46

## 2018-09-18 RX ADMIN — Medication PRN MG: at 11:28

## 2018-09-18 NOTE — OR
DATE OF PROCEDURE:  09/10/2018

 

PREOPERATIVE DIAGNOSES:

1. Limited peripheral venous access.

2. Prepyloric gastric ulcer, refractory to medical management.

 

POSTOPERATIVE DIAGNOSES:

1. Limited peripheral venous access.

2. Prepyloric gastric ulcer, refractory to medical management.

3. Contaminated intraperitoneal mesh with three points of dense adherence to small bowel.

4. Separate area of adherence to transverse colon and to intraperitoneal mesh.

 

PROCEDURE:

1. Insertion of right subclavian vein triple-lumen catheter (07237).

2. Exploratory laparotomy with lysis of extensive adhesions.

    a.     The distal gastrectomy with Niki-en-Y gastrojejunostomy (72249).

    b.     Truncal vagotomy (34045).

    c.     Removal of intraperitoneal mesh and block with three separate points of small

     bowel adherence (28466).

    d.     Small bowel resection x2 (16395, 29757).

    e.     Small bowel stricturoplasty (26085).

    f.     Repair of area of deserosalization of transverse colon (78924).

    g.     Placement of tube gastrostomy (30053).

    h.     Placement of Interceed mesh to limit recurrent adhesion formation between pelvic

     and abdominal wall, underlying viscera (03564).

 

ANESTHESIA:  General plus epidural.

 

INDICATION FOR PROCEDURE:  This is a 62-year-old female, who last week was noted to have

continued epigastric pain and a followup endoscopy showed no significant healing of the

prepyloric gastric ulcer.  Previous biopsies have been negative as has been the CLOtest and

the plan is to proceed with the distal gastrectomy with Niki-en-Y gastrojejunostomy for

reconstruction.  The patient has quite limited peripheral venous access as well as

indications for postoperative hyperalimentation.  Given this, central line will be placed.

We will try to place this on the right side as the patient has had a left clavicular

fracture.  Potential risks of the procedure including bleeding, infection, leaks from

various GI tract closures, problems with bowel obstruction over time as well as possibility

of cardiopulmonary, septic, or hemorrhagic complications leading to death were discussed.

In addition with regard to the central line insertion, possible injury to vasculature or

lung during insertion were all gone over and the patient wishes to proceed.

 

DESCRIPTION OF PROCEDURE:  The patient was taken to the operating room after epidural

catheter was then placed and general endotracheal anesthetic was induced and the epidural

infusion begun.  The Cooper catheter was inserted, and the upper chest and neck areas were

then prepped and draped and the right subclavian vein was cannulated and guidewire passed

and over the guidewire a triple-lumen catheter was positioned.  Good in and out flow through

the catheter was confirmed and flushed with heparinized saline and sutured to skin with some

3-0 silk stitch.  Subsequent chest x-ray showed good catheter position without

complications.

 

The abdomen was then prepped and draped.  The patient had a previous perforated duodenal

ulcer and the previous upper midline incision was then reused and carried down through the

skin and subcutaneous tissue and into the peritoneal cavity, quite extensive adhesions were

present.  These were taken down between the abdominal and pelvic wall and the underlying

omentum and viscera.  Once these were taken down, the patient was noted to have previously

placed intraperitoneal mesh.  This was then divided in the midline.  There was a single

point where the transverse colon was adherent to the mesh.  This was dissected off.  There

was an area of deserosalization of the transverse colon, which at that point was controlled

with transversely oriented MIRIAN staple line and reinforced with 3-0 Vicryl seromuscular

stitch.  No significant luminal compromise was noted after repair of the transverse colon.

The patient had three separate areas of small bowel.  They were also densely adherent to the

mesh.  The small bowel was then divided, flushed with the mesh and this mesh by definition

this morning was going to be contaminated with subsequent gastrectomy and requirement for

the bowel division and given this the intraperitoneal mesh, which was an old Cowan-Estiven mesh,

was removed from the underlying abdominal wall with three segments of attached small bowel

adherent to it.

 

The small bowel was then examined.  At one point where the small bowel had been detached was

simply strictured and stricturoplasty was accomplished at that level flipping the bowel back

on itself.  A small enterotomy being made and a 60 mm MIRIAN stapler being placed and fired and

the original side opening closed with a MIRIAN purple load.  No mesenteric defect was present

here, and the angles of anastomosis were reinforced with some 3-0 Vicryl seromuscular

stitch.  The small bowel was the examined.  The areas that were needed to be then resected

were both fired distal to where one would be constructing a Niki limb in an otherwise quite

thin patient.  Given this, those 2 segments of small bowel were each divided proximally and

distally and the underlying mesentry divided with the MIRIAN staplers, as had been the bowel.

In each case, the bowel was flipped back on itself at the point of resection and internal

firing of the MIRIAN tan loads was accomplished.  This being 60 mm of firing and common opening

then closed transversely with same stapler.  Angles were then anastomosed and mesenteric

defects were then approximated with some 3-0 Vicryl stitch.

 

Attention was now taken to the gastrectomy.  The patient had an obvious palpable prepyloric

ulcer just proximal to the pylorus.  This area was firm.  There was no lymphadenopathy

around it or suggestion of it being malignant per se.  At this point, the duodenum was then

encircled just distal to the pylorus and divided there with a MIRIAN black curved load.  The

omentum was then divided off the greater curvature of stomach and the proximal most duodenum

up to the point of the intersection of the right and left gastroepiploic vessels.  These

being divided with the MIRIAN mesenteric loads.  The lesser curvature was then divided up to

the point just below the esophagogastric junction.  The stomach between those two points was

then divided with combination of black and purple MIRIAN loads and the specimen delivered from

the field.  This was opened up and inspected and again appeared to be consistent with a

benign gastric ulcer.

 

Truncal vagotomy was then accomplished with isolation of the vagus nerves. In each case,

these were then divided with Harmonic scalpel.

 

The small bowel was then traced out roughly at 20 cm distal to the ligament of Treitz, was

divided transversely and then the bowel traced out additional 75 cm.  Given the patient has

a 75 cm Niki limb, the level of the side-to-side enteroenterostomy was accomplished with

internal firing of the Endo-MIRIAN 60 mm stapler.  The common opening was closed transversely

with same stapler, angles anastomosed, and mesenteric defect approximated with some 3-0

Vicryl stitch.  The Niki limb was then run through the retrocolic tunnel through the

transverse mesocolon and easily came up to the divided stomach.  The stomach staple line

approximately was then opened enough to allow placement of a size 28 EEA stapler anvil.

This was then placed into the stomach and this was then re-stapled and then the anvil was

brought out through the most dependent portion of the stomach.  The Niki limb was then

opened and the main body of the EEA stapler passed a few centimeters into the lumen of small

bowel, with anvil fired, thus creating the gastrojejunostomy.  Upon removal of stapler,

double donuts of the mucosa were noted within it, and small bowel was closed off with a tan

staple line.

 

The gastrojejunostomy appeared to be widely patent and was then reinforced with some 3-0

Vicryl seromuscular stitch.  The point where the stomach passed through the transverse

mesocolon was fixed with some 3-0 Vicryl stitch as well to facilitate the patient's

postoperative care.  The tube gastrostomy was then fashioned.  The size 18-French Cooper

catheter was brought through a stab wound in the left subcostal area and then the underlying

stomach was then opened up and around this a pursestring stitch of 3-0 Vicryl stitch placed.

The gastrostomy tube was then placed and inflated with 10 mL of saline and the sutures were

then pulled up and tied.  This suture as well as three additional sutures were taken between

the stomach and the abdominal wall for fixing the tube in position and the tube was affixed

to the skin level with a 2-0 nylon stitch.

 

At this point, the abdomen was irrigated with antibiotic-containing saline solution.  The

Niki-en-Y jejunojejunostomy was then reinforced with fibrin sealant as well as the

gastrojejunostomy and then also the duodenal stump.  Single Vinay-Mcdaniels drain was then

taken up through a stab wound lateral to the duodenal stump and brought across the duodenum

stump from the adjacent to the gastrojejunostomy.  Interceed mesh was then placed between

the pelvic and abdominal wall and the underlying viscera to limit recurrent adhesion

formation in the midline fascia and approximated with #2 Vicryl stitch. We were able to get

fairly healthy bites of the fascia so that the patient has at least some chance of not

developing recurrent hernia.  The skin and subcutaneous tissue were felt to be high risk for

wound infection and primary closure was undertaken.  Given this, the wound was packed open

with Iodoform gauze for a planned delayed primary closure and a drain sutured to the skin

with some 3-0 Vicryl stitch and the patient was taken to the recovery room in satisfactory

condition.

 

Physician assistant, Laura Montoya, played an essential role in assisting in this case,

helping to position the patient, retract structures as needed, as well as suturing and

cutting sutures when indicated.  Her presence improved patient safety and decreased the

operative time.

 

 

 

 

Erick Jensen MD

DD:  09/15/2018 12:22:53

DT:  09/17/2018 12:28:18

Job #:  [id number]

## 2018-09-18 NOTE — HP
HISTORY OF PRESENT ILLNESS:  This is a 62-year-old status post a recent distal gastrectomy

for nonhealing gastric ulcer.  She had been discharged home and appeared to be doing fairly

well, but now comes back dehydrated and in relatively poor status in terms of nutrition and

hydration, as well as poor pain control.  Given this, the patient will be admitted for

rehydration, initiation of some TPN once again, and working on her postoperative

deconditioning.

 

PAST MEDICAL HISTORY:  Includes hypertension, COPD, history of alcohol abuse,

hyperlipidemia, recent fracture of the left clavicle, and problems with peptic ulcer disease

having had one perforated ulcer and then most recently resection of a benign distal gastric

ulcer.

 

MEDICATIONS:  As per the admission sheet.

 

SOCIAL HISTORY:  The patient lives with her .  The  does report some ongoing

alcohol use.  She presently does not smoke.

 

FAMILY HISTORY:  Noncontributory.

 

ALLERGIES:  PER THE ATTACHED SHEETS AS WELL.

 

 

PHYSICAL EXAMINATION:

GENERAL:  The patient is alert.

VITAL SIGNS:  Blood pressure 150/88, heart rate is 115, pulse oximetry is 91% on 1 L nasal

cannula which is actually fairly good for her, and temperature is 98.5.

HEENT:  Shows some areas of dehydration.

HEART:  Regular rhythm with mild tachycardia.

LUNGS:  Show diminished sounds.  No wheezing is heard presently.

ABDOMEN:  Shows the incision to be clean.  NG tube in place, which is otherwise clean.

PELVIC AND RECTAL:  Deferred.

 

LABORATORY DATA:  Labs are pending.

 

PLAN:  The plan will be to admit the patient with initiation of TPN and otherwise hydration,

pain control, work with Physical Therapy, and work in terms of getting increased oral

intake.

 

 

 

 

Erick Jensen MD

DD:  09/18/2018 10:15:41

DT:  09/18/2018 15:44:22

Job #:  [id number]

## 2018-09-19 RX ADMIN — MOMETASONE FUROATE AND FORMOTEROL FUMARATE DIHYDRATE SCH PUFF: 200; 5 AEROSOL RESPIRATORY (INHALATION) at 20:13

## 2018-09-19 RX ADMIN — MOMETASONE FUROATE AND FORMOTEROL FUMARATE DIHYDRATE SCH PUFF: 200; 5 AEROSOL RESPIRATORY (INHALATION) at 07:29

## 2018-09-19 RX ADMIN — LEUCINE, PHENYLALANINE, LYSINE, METHIONINE, ISOLEUCINE, VALINE, HISTIDINE, THREONINE, TRYPTOPHAN, ALANINE, GLYCINE, ARGININE, PROLINE, SERINE, TYROSINE, SODIUM ACETATE, DIBASIC POTASSIUM PHOSPHATE, MAGNESIUM CHLORIDE, SODIUM CHLORIDE, CALCIUM CHLORIDE, DEXTROSE SCH MLS/HR
365; 280; 290; 200; 300; 290; 240; 210; 90; 1035; 515; 575; 340; 250; 20; 340; 261; 51; 59; 33; 15 INJECTION INTRAVENOUS at 12:06

## 2018-09-19 RX ADMIN — HEPARIN SODIUM (PORCINE) LOCK FLUSH IV SOLN 100 UNIT/ML PRN UNITS: 100 SOLUTION at 04:33

## 2018-09-19 RX ADMIN — Medication PRN MG: at 19:23

## 2018-09-19 NOTE — PN
DATE OF SERVICE:  09/19/2018

 

SUBJECTIVE:  Maureen was admitted yesterday for dehydration.  She had a partial gastrectomy,

G-tube placement, and small bowel resection x2 on 09/10/2018.  She states that she was

admitted yesterday.  She had diarrhea for 2 days and an increased amount of abdominal pain.

Today, vital signs have been stable with a temp max of 99.5.

 

REVIEW OF SYSTEMS:  HEENT:  Negative.

NECK:  Negative.

CHEST:  Does have a cough that is chronic.  History of smoking.  No chest pain.

HEART:  No fast or irregular heartbeat.

ABDOMEN:  Pain is controlled with a PCA.  Oral intake was 520, on a soft diet.  Urine output

820.  She did have 10% of dinner and 50% of snack.  No bowel movements.

EXTREMITIES:  Negative for any joint pain.  She does have a fractured clavicle.  Left arm in

a sling.

BACK:  Chronic back pain.  No change.

SKIN:  Without rash.

NEURO:  Intact.

PSYCHIATRIC:  Mood and affect appropriate.

 

Remainder of review of systems negative for any pertinent positives and negatives.

 

OBJECTIVE:  GENERAL:  Maureen Valdez is a pleasant 62-year-old female.

VITAL SIGNS:  TPR is 98.6, 103, 18, and blood pressure 163/78.

HEENT:  Negative.

NECK:  Supple.

HEART:  Regular rate and rhythm.

LUNGS:  Do reveal decreased breath sounds bilaterally.  Occasional rales heard.

ABDOMEN:  Staples intact.  Gastrostomy tube is intact and putting out small amounts of

drainage.  Abdominal binder on.

EXTREMITIES:  Without peripheral edema.

NEURO:  Intact.

PSYCHIATRIC:  Mood and affect appropriate.

 

ASSESSMENT:

1. Abdominal pain.

2. Dehydration.

3. Malnutrition.

4. Status post partial gastrectomy on 09/10/2018.

 

PLAN:

1. Increase TPN to 80 mL per hour, same content.

2. Physical Therapy evaluation and treatment for postoperative weakness and strengthening.

3. Occupational Therapy evaluation and treatment for postoperative weakness and

    strengthening.

4. Respiratory Therapy evaluation and treatment for decreased breath sounds and history of

    chronic smoking.

5. To ambulate 6 times daily in the [place], distance and time as tolerated.

6. Check CBC, CMP, mag, and phos in a.m.

7. We will evaluate p.r.n. or in a.m.

 

 

 

 

Laura Montoya PA-C

DD:  09/19/2018 07:52:07

DT:  09/19/2018 09:28:27

Job #:  [id number]

## 2018-09-20 RX ADMIN — MOMETASONE FUROATE AND FORMOTEROL FUMARATE DIHYDRATE SCH PUFF: 200; 5 AEROSOL RESPIRATORY (INHALATION) at 07:14

## 2018-09-20 RX ADMIN — LEUCINE, PHENYLALANINE, LYSINE, METHIONINE, ISOLEUCINE, VALINE, HISTIDINE, THREONINE, TRYPTOPHAN, ALANINE, GLYCINE, ARGININE, PROLINE, SERINE, TYROSINE, SODIUM ACETATE, DIBASIC POTASSIUM PHOSPHATE, MAGNESIUM CHLORIDE, SODIUM CHLORIDE, CALCIUM CHLORIDE, DEXTROSE SCH MLS/HR
365; 280; 290; 200; 300; 290; 240; 210; 90; 1035; 515; 575; 340; 250; 20; 340; 261; 51; 59; 33; 15 INJECTION INTRAVENOUS at 08:52

## 2018-09-20 RX ADMIN — MOMETASONE FUROATE AND FORMOTEROL FUMARATE DIHYDRATE SCH PUFF: 200; 5 AEROSOL RESPIRATORY (INHALATION) at 21:08

## 2018-09-20 RX ADMIN — LEUCINE, PHENYLALANINE, LYSINE, METHIONINE, ISOLEUCINE, VALINE, HISTIDINE, THREONINE, TRYPTOPHAN, ALANINE, GLYCINE, ARGININE, PROLINE, SERINE, TYROSINE, SODIUM ACETATE, DIBASIC POTASSIUM PHOSPHATE, MAGNESIUM CHLORIDE, SODIUM CHLORIDE, CALCIUM CHLORIDE, DEXTROSE SCH MLS/HR
365; 280; 290; 200; 300; 290; 240; 210; 90; 1035; 515; 575; 340; 250; 20; 340; 261; 51; 59; 33; 15 INJECTION INTRAVENOUS at 21:07

## 2018-09-20 RX ADMIN — LEUCINE, PHENYLALANINE, LYSINE, METHIONINE, ISOLEUCINE, VALINE, HISTIDINE, THREONINE, TRYPTOPHAN, ALANINE, GLYCINE, ARGININE, PROLINE, SERINE, TYROSINE, SODIUM ACETATE, DIBASIC POTASSIUM PHOSPHATE, MAGNESIUM CHLORIDE, SODIUM CHLORIDE, CALCIUM CHLORIDE, DEXTROSE SCH MLS/HR
365; 280; 290; 200; 300; 290; 240; 210; 90; 1035; 515; 575; 340; 250; 20; 340; 261; 51; 59; 33; 15 INJECTION INTRAVENOUS at 07:37

## 2018-09-20 NOTE — CR
Clavicle Lt

 

CLINICAL HISTORY: Clavicle fracture

 

FINDINGS: There is a known fracture of the distal clavicle. Appearance is similar to the 8/7/2018 eber
dy

 

Impression: Displaced fracture of the distal clavicle

## 2018-09-20 NOTE — PN
DATE OF SERVICE:  09/20/2018

 

SUBJECTIVE:  Maureen was offered to be changed to a bigger room after thinking about it, she

decline, tolerating TPN.  She has been afebrile.  Oral intake 300.  She consumed 25% of

breakfast, 50% of lunch, and 0% of her evening meal.  One bowel movement.  Pain has been

controlled.  She had physical therapy and respiratory therapy, assessment and care.  She

reports her G-tube pulling and painful.

 

REVIEW OF SYSTEMS:  Remainder of review of systems negative for any pertinent positives and

negatives.

 

OBJECTIVE:  GENERAL:  Maureen Valdez is a 62-year-old female.

VITAL SIGNS:  TPR is 98.2, 97, 18 and blood pressure 124/76.

HEENT:  Negative.

NECK:  Supple.

HEART:  Regular rate and rhythm.

LUNGS:  Clear.

ABDOMEN:  Staples intact.  Gastrostomy tubing is pulling.  There is redness noted.  There is

some dark pink skin noted around the tubing.  No infection.  Abdominal binder has been on.

EXTREMITIES:  Without peripheral edema.

 

ASSESSMENT:  Abdominal pain dehydration, malnutrition, SP partial gastrectomy 09/10/2018,

nicotine addiction, hypertension, chronic obstructive pulmonary disease, history of alcohol

abuse, hyperlipidemia, recent fracture of left clavicle.

 

PLAN:

1. Continue same TPN rate and content.

2. Check CBC, CMP, mag, and phos.

3. We will check with Ortho in regard to her followup appointment on her left fracture

    clavicle.

4. Remove staples and place Steri-Strips.

5. Apply a StatLock gastrostomy tube to prevent pulling on the tube, which is causing

    increased amount of pain.

6. Continue ambulation 6 times short distances a day.

7. Good pulmonary toilet.

8. We will evaluate p.r.n. or in a.m.

 

 

 

 

Laura Montoya PA-C

DD:  09/20/2018 07:14:40

DT:  09/20/2018 08:10:39

Job #:  [id number]

## 2018-09-21 RX ADMIN — LEUCINE, PHENYLALANINE, LYSINE, METHIONINE, ISOLEUCINE, VALINE, HISTIDINE, THREONINE, TRYPTOPHAN, ALANINE, GLYCINE, ARGININE, PROLINE, SERINE, TYROSINE, SODIUM ACETATE, DIBASIC POTASSIUM PHOSPHATE, MAGNESIUM CHLORIDE, SODIUM CHLORIDE, CALCIUM CHLORIDE, DEXTROSE SCH MLS/HR
365; 280; 290; 200; 300; 290; 240; 210; 90; 1035; 515; 575; 340; 250; 20; 340; 261; 51; 59; 33; 15 INJECTION INTRAVENOUS at 10:26

## 2018-09-21 RX ADMIN — Medication PRN MG: at 02:07

## 2018-09-21 RX ADMIN — OXYCODONE HYDROCHLORIDE AND ACETAMINOPHEN PRN TAB: 5; 325 TABLET ORAL at 10:30

## 2018-09-21 RX ADMIN — OXYCODONE HYDROCHLORIDE AND ACETAMINOPHEN PRN TAB: 5; 325 TABLET ORAL at 19:31

## 2018-09-21 RX ADMIN — MOMETASONE FUROATE AND FORMOTEROL FUMARATE DIHYDRATE SCH PUFF: 200; 5 AEROSOL RESPIRATORY (INHALATION) at 20:41

## 2018-09-21 RX ADMIN — MOMETASONE FUROATE AND FORMOTEROL FUMARATE DIHYDRATE SCH PUFF: 200; 5 AEROSOL RESPIRATORY (INHALATION) at 09:20

## 2018-09-21 RX ADMIN — OXYCODONE HYDROCHLORIDE AND ACETAMINOPHEN PRN TAB: 5; 325 TABLET ORAL at 15:08

## 2018-09-21 RX ADMIN — LEUCINE, PHENYLALANINE, LYSINE, METHIONINE, ISOLEUCINE, VALINE, HISTIDINE, THREONINE, TRYPTOPHAN, ALANINE, GLYCINE, ARGININE, PROLINE, SERINE, TYROSINE, SODIUM ACETATE, DIBASIC POTASSIUM PHOSPHATE, MAGNESIUM CHLORIDE, SODIUM CHLORIDE, CALCIUM CHLORIDE, DEXTROSE SCH MLS/HR
365; 280; 290; 200; 300; 290; 240; 210; 90; 1035; 515; 575; 340; 250; 20; 340; 261; 51; 59; 33; 15 INJECTION INTRAVENOUS at 22:21

## 2018-09-21 NOTE — PN
DATE OF SERVICE:  09/21/2018

 

HISTORY OF PRESENT ILLNESS:  Maureen's vital signs are stable.  She has had 2 bowel

movements.  She is ambulating from her bed to her bathroom, sat up in the chair for a short

period of time while her bed was being neat, otherwise has been difficult for her to walk

due to increase in her chronic back pain.  She does report having sore areas on her shoulder

blades, her coccyx and her heels.

 

Sodium 131, hemoglobin 8.6, phosphorus 5.3, and magnesium is 1.7.

 

Gaymar mattress was put on during the night.

 

REVIEW OF SYSTEMS:  Remainder of review of systems negative for any pertinent positives and

negatives.

 

OBJECTIVE:  GENERAL:  Maureen Valdez is a 62-year-old female.  She is awake, alert, and quite

talkative this morning.

VITAL SIGNS:  TPR is 98.1, 88, 16 and blood pressure 122/63.

HEENT:  Negative.

NECK:  Supple.

HEART:  Regular rate and rhythm.

LUNGS:  Clear.

ABDOMEN:  Negative.  Abdominal binder is on.

EXTREMITIES:  Without peripheral edema.

 

ASSESSMENT:  Abdominal pain, dehydration, malnutrition, SP partial gastrectomy 09/10/2018,

nicotine addiction, hypertension, chronic obstructive pulmonary disease, history of alcohol

abuse, hyperlipidemia, and recent fracture of left clavicle.

 

PLAN:

1. TPN rate to continue at 80 mL/h.  We will consult dietitian in regard to low magnesium

    and low sodium.

2. Sit in the chair 4 times a day.

3. Discontinue PCA.

4. Percocet 5/325 mg one to two every 4 hours p.r.n. pain.  She requested to be changed to

    Percocet, feeling that it works better than the PCA.

5. Ambulation encouraged at least 6 times a day, short distance and time as tolerated.

6. To watch skin for pressure areas on bony prominences to prevent any pressure ulcers.

7. Dietary consult to discuss dietary options to promote oral intake.

8. We will evaluate p.r.n. or in a.m.

 

 

 

 

Laura Montoya PA-C

DD:  09/21/2018 07:03:06

DT:  09/21/2018 07:45:06

Job #:  [id number]

## 2018-09-22 RX ADMIN — MOMETASONE FUROATE AND FORMOTEROL FUMARATE DIHYDRATE SCH PUFF: 200; 5 AEROSOL RESPIRATORY (INHALATION) at 22:43

## 2018-09-22 RX ADMIN — OXYCODONE HYDROCHLORIDE AND ACETAMINOPHEN PRN TAB: 5; 325 TABLET ORAL at 04:07

## 2018-09-22 RX ADMIN — OXYCODONE HYDROCHLORIDE AND ACETAMINOPHEN PRN TAB: 5; 325 TABLET ORAL at 19:46

## 2018-09-22 RX ADMIN — LEUCINE, PHENYLALANINE, LYSINE, METHIONINE, ISOLEUCINE, VALINE, HISTIDINE, THREONINE, TRYPTOPHAN, ALANINE, GLYCINE, ARGININE, PROLINE, SERINE, TYROSINE, SODIUM ACETATE, DIBASIC POTASSIUM PHOSPHATE, MAGNESIUM CHLORIDE, SODIUM CHLORIDE, CALCIUM CHLORIDE, DEXTROSE SCH MLS/HR
365; 280; 290; 200; 300; 290; 240; 210; 90; 1035; 515; 575; 340; 250; 20; 340; 261; 51; 59; 33; 15 INJECTION INTRAVENOUS at 10:42

## 2018-09-22 RX ADMIN — OXYCODONE HYDROCHLORIDE AND ACETAMINOPHEN PRN TAB: 5; 325 TABLET ORAL at 08:25

## 2018-09-22 RX ADMIN — OXYCODONE HYDROCHLORIDE AND ACETAMINOPHEN PRN TAB: 5; 325 TABLET ORAL at 15:01

## 2018-09-22 RX ADMIN — MOMETASONE FUROATE AND FORMOTEROL FUMARATE DIHYDRATE SCH PUFF: 200; 5 AEROSOL RESPIRATORY (INHALATION) at 07:40

## 2018-09-22 RX ADMIN — OXYCODONE HYDROCHLORIDE AND ACETAMINOPHEN PRN TAB: 5; 325 TABLET ORAL at 00:15

## 2018-09-22 NOTE — PN
DATE OF SERVICE:  09/22/2018

 

SUBJECTIVE:  Maureen is having increased amount of pain around her G-tube.  She states pain

goes into her back.  She has been up ambulating yesterday.  She is taking Percocet 5/325 mg

2 every 4 hours.  Oral intake was 560.  Per patient recall, she had 25% of breakfast, 50% of

lunch, and 25% of dinner.  She is getting Ensure 3 times a day, but states she does not care

for the taste, so has only been drinking about 1/2 of them, is requesting to be in a

different room.  She states she has been in the same room for about almost 4 weeks.  She

states she was going to change rooms last week, but she did not want to at that time, but is

ready to change now.

 

REVIEW OF SYSTEMS:  HEENT:  Negative.

NECK:  Negative.

CHEST:  Negative.

LUNGS:  No shortness of breath.  Working with Respiratory Therapy.

ABDOMEN:  Pain as stated around G-tube site.  Percocet is not holding her the complete 4

hours.  Last bowel movement was 09/21/2018.

EXTREMITIES:  Negative.  Continues to have pain in her back, which is chronic.

SKIN:  Without rash.

PSYCHIATRIC:  Tearful, discouraged today, thinking a change in room will help.

 

Remainder of review of systems is negative for any pertinent positives and negatives.

 

OBJECTIVE:  GENERAL:  Maureen Valdez is a 62-year-old female.  She is quite tearful today.

VITAL SIGNS:  TPR is 97.7, 97, 18, blood pressure 130/70.

HEENT:  Negative.

NECK:  Supple.

HEART:  Regular rate and rhythm.

LUNGS:  Clear.

ABDOMEN:  Steri-Strips incision looks good.  Abdomen is subjectively tender in all 4

quadrants around G-tube.  Very minimal pinkness noted.  There is a StatLock on, so there is

no extra pulling on the G-tube. Abdominal binder has been on.

EXTREMITIES:  Without peripheral edema.

NEURO:  Intact.

 

ASSESSMENT:

1. Abdominal pain.

2. Dehydration.

3. Malnutrition.

4. SP partial gastrectomy 09/10/2018.

5. Nicotine addiction.

6. Hypertension.

7. Chronic obstructive pulmonary disease.

8. History of alcohol abuse.

9. Hyperlipidemia.

10.Recent fracture of left clavicle.

 

PLAN:

1. Continue TPN same rate and content.

2. Check CBC, CMP, mag, and phos in the a.m.

3. Lidocaine, prilocaine, EMLA cream (use sparingly around gastrostomy tube p.r.n. pain at

    the tube site).

4. Atarax 25 mg p.o. q.4 hours for additional pain.

5. Change dietary supplements to clear, Ensure, or Sajan.  She does not like the milk-

    based protein supplements.  Soft diet changed to room service.

6. Communication order written for accurate intake and output to record intake actual food

    eaten and amount at bedside.  DC normal saline.  Flush gastrostomy tube with 100 mL of

    water b.i.d.  Good pulmonary toilet.  We will evaluate.  Change rooms to a brighter

    room with more windows, we will evaluate p.r.n. or in the a.m.

 

 

 

 

Laura Montoya PA-C

DD:  09/22/2018 07:35:03

DT:  09/22/2018 09:11:36

Job #:  [id number]

## 2018-09-23 RX ADMIN — MOMETASONE FUROATE AND FORMOTEROL FUMARATE DIHYDRATE SCH PUFF: 200; 5 AEROSOL RESPIRATORY (INHALATION) at 20:22

## 2018-09-23 RX ADMIN — MOMETASONE FUROATE AND FORMOTEROL FUMARATE DIHYDRATE SCH PUFF: 200; 5 AEROSOL RESPIRATORY (INHALATION) at 08:17

## 2018-09-23 RX ADMIN — OXYCODONE HYDROCHLORIDE AND ACETAMINOPHEN PRN TAB: 5; 325 TABLET ORAL at 08:08

## 2018-09-23 RX ADMIN — OXYCODONE HYDROCHLORIDE AND ACETAMINOPHEN PRN TAB: 5; 325 TABLET ORAL at 03:35

## 2018-09-23 RX ADMIN — OXYCODONE HYDROCHLORIDE AND ACETAMINOPHEN PRN TAB: 5; 325 TABLET ORAL at 12:15

## 2018-09-23 RX ADMIN — OXYCODONE HYDROCHLORIDE AND ACETAMINOPHEN PRN TAB: 5; 325 TABLET ORAL at 20:23

## 2018-09-23 RX ADMIN — LEUCINE, PHENYLALANINE, LYSINE, METHIONINE, ISOLEUCINE, VALINE, HISTIDINE, THREONINE, TRYPTOPHAN, ALANINE, GLYCINE, ARGININE, PROLINE, SERINE, TYROSINE, SODIUM ACETATE, DIBASIC POTASSIUM PHOSPHATE, MAGNESIUM CHLORIDE, SODIUM CHLORIDE, CALCIUM CHLORIDE, DEXTROSE SCH MLS/HR
365; 280; 290; 200; 300; 290; 240; 210; 90; 1035; 515; 575; 340; 250; 20; 340; 261; 51; 59; 33; 15 INJECTION INTRAVENOUS at 00:06

## 2018-09-23 RX ADMIN — LEUCINE, PHENYLALANINE, LYSINE, METHIONINE, ISOLEUCINE, VALINE, HISTIDINE, THREONINE, TRYPTOPHAN, ALANINE, GLYCINE, ARGININE, PROLINE, SERINE, TYROSINE, SODIUM ACETATE, DIBASIC POTASSIUM PHOSPHATE, MAGNESIUM CHLORIDE, SODIUM CHLORIDE, CALCIUM CHLORIDE, DEXTROSE SCH MLS/HR
365; 280; 290; 200; 300; 290; 240; 210; 90; 1035; 515; 575; 340; 250; 20; 340; 261; 51; 59; 33; 15 INJECTION INTRAVENOUS at 12:50

## 2018-09-23 RX ADMIN — OXYCODONE HYDROCHLORIDE AND ACETAMINOPHEN PRN TAB: 5; 325 TABLET ORAL at 16:28

## 2018-09-23 NOTE — PN
DATE OF SERVICE:  09/23/2018

 

SUBJECTIVE:  The patient reports less pain around the G-tube site with the lidocaine

ointment.  She walked five times and sat in a chair, six times yesterday.  She is drinking

the clear apple Ensure.  Her total intake was 1330.  She ate much better.  Yesterday, she

had 60% of breakfast, 10% of lunch, and 75% dinner.  Urine output 2210.  TPN is running

without difficulty and pain is better controlled with Atarax.

 

REVIEW OF SYSTEMS:  HEENT:  Negative.

NECK:  Negative.

CHEST:  Negative.

HEART:  Negative.

LUNGS:  Negative.

ABDOMEN:  As above.

:  Negative.

EXTREMITIES:  Negative.

BACK:  Chronic back pain.

PSYCHIATRIC:  Mood and affect okay.  She was moved to a different room.

SKIN:  Without rash.

 

Remainder of review of systems negative for any pertinent positives and negatives.

 

OBJECTIVE:  GENERAL:  The patient is a 62-year-old female.  She is alert and orientated.

VITAL SIGNS:  Her TPR 97.7, 93, 20.  Blood pressure 114/58.

HEENT:  Negative.

NECK:  Supple.

HEART:  Regular rate and rhythm.

LUNGS:  Clear.

ABDOMEN:  Soft, nontender.  G-tube intact.  Abdominal binder is on.

EXTREMITIES:  Without peripheral edema.

 

ASSESSMENT:

1. Abdominal pain.

2. Dehydration.

3. Malnutrition.

4. Status post partial gastrectomy on 09/10/2018.

5. Nicotine addiction.

6. Hypertension.

7. Chronic obstructive pulmonary disease.

8. History of alcohol abuse.

9. Hyperlipidemia.

10.Recent fracture of left clavicle.

 

PLAN:

1. Continue TPN at same rate and content.  Check CBC, CMP, mag, and phos in a.m.

2. We will evaluate p.r.n. or in a.m.

 

 

 

 

Laura Montoya PA-C

DD:  09/23/2018 08:29:07

DT:  09/23/2018 08:42:03

Job #:  [id number]

## 2018-09-24 PROCEDURE — 30233N1 TRANSFUSION OF NONAUTOLOGOUS RED BLOOD CELLS INTO PERIPHERAL VEIN, PERCUTANEOUS APPROACH: ICD-10-PCS | Performed by: SURGERY

## 2018-09-24 RX ADMIN — OXYCODONE HYDROCHLORIDE AND ACETAMINOPHEN PRN TAB: 5; 325 TABLET ORAL at 10:00

## 2018-09-24 RX ADMIN — OXYCODONE HYDROCHLORIDE AND ACETAMINOPHEN PRN TAB: 5; 325 TABLET ORAL at 14:38

## 2018-09-24 RX ADMIN — OXYCODONE HYDROCHLORIDE AND ACETAMINOPHEN PRN TAB: 5; 325 TABLET ORAL at 00:53

## 2018-09-24 RX ADMIN — HEPARIN SODIUM (PORCINE) LOCK FLUSH IV SOLN 100 UNIT/ML PRN UNITS: 100 SOLUTION at 13:36

## 2018-09-24 RX ADMIN — OXYCODONE HYDROCHLORIDE AND ACETAMINOPHEN PRN TAB: 5; 325 TABLET ORAL at 18:36

## 2018-09-24 RX ADMIN — LIDOCAINE AND PRILOCAINE SCH APPLIC: 25; 25 CREAM TOPICAL at 20:07

## 2018-09-24 RX ADMIN — LEUCINE, PHENYLALANINE, LYSINE, METHIONINE, ISOLEUCINE, VALINE, HISTIDINE, THREONINE, TRYPTOPHAN, ALANINE, GLYCINE, ARGININE, PROLINE, SERINE, TYROSINE, SODIUM ACETATE, DIBASIC POTASSIUM PHOSPHATE, MAGNESIUM CHLORIDE, SODIUM CHLORIDE, CALCIUM CHLORIDE, DEXTROSE SCH MLS/HR
365; 280; 290; 200; 300; 290; 240; 210; 90; 1035; 515; 575; 340; 250; 20; 340; 261; 51; 59; 33; 15 INJECTION INTRAVENOUS at 01:19

## 2018-09-24 RX ADMIN — LIDOCAINE AND PRILOCAINE SCH APPLIC: 25; 25 CREAM TOPICAL at 09:19

## 2018-09-24 RX ADMIN — MOMETASONE FUROATE AND FORMOTEROL FUMARATE DIHYDRATE SCH PUFF: 200; 5 AEROSOL RESPIRATORY (INHALATION) at 07:17

## 2018-09-24 RX ADMIN — OXYCODONE HYDROCHLORIDE AND ACETAMINOPHEN PRN TAB: 5; 325 TABLET ORAL at 05:16

## 2018-09-24 RX ADMIN — HEPARIN SODIUM (PORCINE) LOCK FLUSH IV SOLN 100 UNIT/ML PRN UNITS: 100 SOLUTION at 09:58

## 2018-09-24 RX ADMIN — MOMETASONE FUROATE AND FORMOTEROL FUMARATE DIHYDRATE SCH PUFF: 200; 5 AEROSOL RESPIRATORY (INHALATION) at 20:06

## 2018-09-24 RX ADMIN — OXYCODONE HYDROCHLORIDE AND ACETAMINOPHEN PRN TAB: 5; 325 TABLET ORAL at 23:06

## 2018-09-24 NOTE — PN
DATE OF SERVICE:  09/24/2018

 

SUBJECTIVE:  The patient's hemoglobin this morning was 8.3.  Oral intake was 780.  She ate

50% of her breakfast, 100% of her lunch and 0% of her evening meal.  She has been up

ambulating.  The lidocaine ointment does help around her G-tube site and was ordered p.r.n.,

nursing staff elected not to give this to her.  She is requesting to have this ordered.

 

REVIEW OF SYSTEMS:  HEENT:  Negative.

NECK:  Negative.

HEART:  No chest pain, shortness of breath, fast or irregular heart beat.

ABDOMEN:  Pain is controlled with Percocet and was controlled with the lidocaine ointment.

Bowel movements last one was 09/21.

EXTREMITIES:  Negative.

BACK:  Chronic low back pain, but has improved with activity.

SKIN:  Without rash.

 

Remainder of review of systems is negative for any pertinent positives and negatives.

 

OBJECTIVE:  GENERAL:  The patient is a 62-year-old female.  She is sleeping this morning and

was aroused.

VITAL SIGNS:  TPR is 98.1, 96, 20, blood pressure 134/66.

HEENT:  Negative.

NECK:  Supple.

HEART:  Regular rate and rhythm.

LUNGS:  Clear.

ABDOMEN:  ANTONINO tube intact.  Steri-Strips in place.  Abdominal binder is on.

EXTREMITIES:  Without peripheral edema.

 

ASSESSMENT:  Abdominal pain, dehydration, malnutrition, status post partial gastrectomy on

09/10/2018, nicotine addiction, hypertension, chronic obstructive pulmonary disease, history

of alcohol abuse, hyperlipidemia, recent fracture of left clavicle.

 

PLAN:

1. Give 1 unit of packed red blood cells.

2. Schedule lidocaine ointment to b.i.d.

3. Schedule daily dietary teaching reinforcement of partial gastrectomy.

4. Decrease TPN to 60 mL per hour.

5. Check CBC, CMP, mag, and phos in a.m.

6. We will evaluate p.r.n. or in a.m.

 

 

 

Laura Montoya PA-C

DD:  09/24/2018 07:35:05

DT:  09/24/2018 07:50:33

Job #:  [id number]

## 2018-09-25 RX ADMIN — LEUCINE, PHENYLALANINE, LYSINE, METHIONINE, ISOLEUCINE, VALINE, HISTIDINE, THREONINE, TRYPTOPHAN, ALANINE, GLYCINE, ARGININE, PROLINE, SERINE, TYROSINE, SODIUM ACETATE, DIBASIC POTASSIUM PHOSPHATE, MAGNESIUM CHLORIDE, SODIUM CHLORIDE, CALCIUM CHLORIDE, DEXTROSE SCH MLS/HR
365; 280; 290; 200; 300; 290; 240; 210; 90; 1035; 515; 575; 340; 250; 20; 340; 261; 51; 59; 33; 15 INJECTION INTRAVENOUS at 08:51

## 2018-09-25 RX ADMIN — LIDOCAINE AND PRILOCAINE SCH APPLIC: 25; 25 CREAM TOPICAL at 09:11

## 2018-09-25 RX ADMIN — LIDOCAINE AND PRILOCAINE SCH APPLIC: 25; 25 CREAM TOPICAL at 20:58

## 2018-09-25 RX ADMIN — OXYCODONE HYDROCHLORIDE AND ACETAMINOPHEN PRN TAB: 5; 325 TABLET ORAL at 20:56

## 2018-09-25 RX ADMIN — OXYCODONE HYDROCHLORIDE AND ACETAMINOPHEN PRN TAB: 5; 325 TABLET ORAL at 11:26

## 2018-09-25 RX ADMIN — OXYCODONE HYDROCHLORIDE AND ACETAMINOPHEN PRN TAB: 5; 325 TABLET ORAL at 07:26

## 2018-09-25 RX ADMIN — MOMETASONE FUROATE AND FORMOTEROL FUMARATE DIHYDRATE SCH PUFF: 200; 5 AEROSOL RESPIRATORY (INHALATION) at 20:58

## 2018-09-25 RX ADMIN — HEPARIN SODIUM (PORCINE) LOCK FLUSH IV SOLN 100 UNIT/ML PRN UNITS: 100 SOLUTION at 04:03

## 2018-09-25 RX ADMIN — MOMETASONE FUROATE AND FORMOTEROL FUMARATE DIHYDRATE SCH PUFF: 200; 5 AEROSOL RESPIRATORY (INHALATION) at 07:08

## 2018-09-25 RX ADMIN — OXYCODONE HYDROCHLORIDE AND ACETAMINOPHEN PRN TAB: 5; 325 TABLET ORAL at 15:33

## 2018-09-25 RX ADMIN — OXYCODONE HYDROCHLORIDE AND ACETAMINOPHEN PRN TAB: 5; 325 TABLET ORAL at 03:13

## 2018-09-25 NOTE — PN
DATE OF SERVICE:  09/25/2018

 

SUBJECTIVE:  The patient received 1 unit of packed red blood cells yesterday.  Her

hemoglobin was 9.6 this morning.  She continues to have persistent metabolic imbalance.

Oral intake was 1220.  Urine output 1285.  She consumed 100% of breakfast, 100% of lunch,

and 50% of dinner.  She reports her pain is controlled.  She has been up ambulating and

sitting in the chair.  She is stating that she would like to be discharged to home tomorrow.

 

REVIEW OF SYSTEMS:  HEENT:  Negative.

NECK:  Negative.

LUNGS and HEART:  Negative.

ABDOMEN:  As above.

EXTREMITIES:  Negative.

BACK:  Persistent chronic back pain.

SKIN:  Without rash.

NEURO:  Intact.  Negative for headache or dizziness.

PSYCHIATRIC:  Negative.

 

Remainder of review of systems negative for any pertinent positives and negatives.

 

OBJECTIVE:  GENERAL:  The patient is a 62-year-old female.  She is alert and orientated.

VITAL SIGNS:  TPR is 97.2, 86, 18, blood pressure 132/63.

HEENT:  Negative.

NECK:  Supple.

HEART:  Regular rate and rhythm.

LUNGS:  Clear.

ABDOMEN:  Negative.  Steri-Strips in place.  Gastrostomy tube intact, and abdominal binder

has been on.

EXTREMITIES:  Without peripheral edema.

NEURO:  Intact.

PSYCHIATRIC:  Mood and affect appropriate.

 

ASSESSMENT:  Abdominal pain, dehydration, malnutrition, status post partial gastrectomy,

date 09/10/2018, nicotine addiction, hypertension, chronic obstructive pulmonary disease,

history of alcohol abuse, hyperlipidemia, recent fracture of left clavicle, and persistent

metabolic imbalance.

 

PLAN:

1. Decrease TPN to 40 mL per hour.

2. Respiratory therapy consult to evaluate for home O2.

3. Home.  Consult discharge planning in regard to referral for home health care and plan

    discharge on Wednesday, 09/26/2018.

4. Consult hospitalists regarding persistent metabolic imbalance.  Check CBC, CMP, mag,

    and phos in a.m.  Good pulmonary toilet.  We will evaluate p.r.n. or in a.m.

 

 

 

 

Laura Montoya PA-C

DD:  09/25/2018 07:21:07

DT:  09/25/2018 08:02:20

Job #:  [id number]

## 2018-09-25 NOTE — PCM.CONS
H&P History of Present Illness





- General


Date of Service: 09/25/18


Admit Problem/Dx: 


 Admission Diagnosis/Problem





Admission Diagnosis/Problem      Abdominal pain








Source of Information: Patient, Provider, RN Notes Reviewed


History Limitations: Reports: No Limitations





- History of Present Illness


Initial Comments - Free Text/Narative: 





I was asked to see Bina by Dr. Jensen regarding metabolic acidosis. She is status 

post partial gastrectomy and has been receiving TPN. Diet has been slowly 

advanced and over the past 2 days has had decent intake. She reports moderate 

to moderately severe pain in her abdomen following surgery which has been 

slowly getting better. She is getting relief from the pain medications. She has 

not had any fevers. She has not had any vomiting or diarrhea. Left shoulder 

pain from her clavicle fracture has been improving. She is weak but has been 

able to get out of bed on her own and is able to ambulate in the hallway with a 

walker. Over the past few days she's been noted to have a mild metabolic 

acidosis with a slight elevation of the anion gap. Kidney function has been 

normal and stable.


  ** Abdomen


Pain Score (Numeric/FACES): 8





  ** Back


Pain Score (Numeric/FACES): 7





  ** Left Shoulder


Pain Score (Numeric/FACES): 0





- Related Data


Allergies/Adverse Reactions: 


 Allergies











Allergy/AdvReac Type Severity Reaction Status Date / Time


 


codeine Allergy Unknown Hives Verified 09/10/18 09:23


 


alendronate sodium Allergy  Rash Verified 09/10/18 09:23





[From Fosamax]     


 


celecoxib Allergy  Rash Verified 09/10/18 09:23


 


cortisone Allergy  Hives Verified 09/10/18 09:23


 


pentazocine Allergy  Rash Verified 09/10/18 09:23


 


propoxyphene Allergy  Rash Verified 09/10/18 09:23


 


alendronic acid Allergy  Hives Uncoded 09/10/18 09:23











Home Medications: 


 Home Meds





Losartan/Hydrochlorothiazide [Losartan-HCTZ 100-12.5 MG] 12.5 - 100 mg PO DAILY 

08/07/18 [History]


Albuterol Sulfate [Proair Hfa] 2 puff PO Q4H PRN 08/21/18 [History]


Fluticasone/Salmeterol [Advair 250-50 Diskus] 1 puff PO BID PRN 08/21/18 [

History]


Pantoprazole 20 mg PO ACBREAKFAST 08/23/18 [History]


amLODIPine [Norvasc] 5 mg PO DAILY 09/06/18 [History]


oxyCODONE HCl/Acetaminophen [Oxycodone-Acetaminophen 5-325] 1 tab PO Q4H PRN 09/ 07/18 [History]











Past Medical History


HEENT History: Reports: Cataract, Other (See Below)


Other HEENT History: wears glasses


Cardiovascular History: Reports: Hypertension, Other (See Below)


Other Cardiovascular History: orthostatic hypotension


Respiratory History: Reports: COPD


Gastrointestinal History: Reports: Cholelithiasis, GERD, GI Bleed


Other Gastrointestinal History: perforated ulcer 2013


OB/GYN History: Reports: Pregnancy


Musculoskeletal History: Reports: Arthritis, Back Pain, Chronic, Other (See 

Below)


Other Musculoskeletal History: Recent fx collar bone


Psychiatric History: Reports: Addiction, Other (See Below)


Other Psychiatric History: ETOH addiction


Hematologic History: Reports: Blood Transfusion(s)





- Infectious Disease History


Infectious Disease History: Reports: Chicken Pox, Measles, Mumps





- Past Surgical History


HEENT Surgical History: Reports: Cataract Surgery, Oral Surgery


Cardiovascular Surgical History: Reports: None


GI Surgical History: Reports: Cholecystectomy, EGD, Hernia, Abdominal


Female  Surgical History: Reports: Tubal Ligation


Musculoskeletal Surgical History: Reports: Arthroscopic Knee





Social & Family History





- Family History


HEENT: Reports: Macular Degeneration


Other HEENT Family History: mother


Cardiac: Reports: Hypertension


Other Cardiac Family History: both parents had hypertension


Respiratory: Reports: COPD


Other Respiratory Family Hisory: father had emphysema


Endocrine/Metabolic: Reports: Diabetes, type II





- Tobacco Use


Smoking Status *Q: Current Every Day Smoker


Years of Tobacco use: 40


Packs/Tins Daily: 0.5


Used Tobacco, but Quit: No


Second Hand Smoke Exposure: Yes





- Caffeine Use


Caffeine Use: Reports: None


Caffeine Use Comment: Occasional rare cup of coffee





- Alcohol Use


Alcohol Use History: Yes





- Recreational Drug Use


Recreational Drug Use: No





H&P Review of Systems





- Review of Systems:


Review Of Systems: See Below


Free Text/Narrative: 





A complete 12 point review of systems was obtained.  Pertinent positives and 

negatives are noted in the history of present illness.  All other systems were 

reviewed and were negative except as noted.





Exam





- Exam


Exam: See Below





- Vital Signs


Vital Signs: 


 Last Vital Signs











Temp  36.5 C   09/25/18 14:36


 


Pulse  108 H  09/25/18 14:36


 


Resp  18   09/25/18 14:36


 


BP  135/70   09/25/18 14:36


 


Pulse Ox  98   09/25/18 14:36











Weight: 43.182 kg





- Exam


Quality Assessment: No: Supplemental Oxygen


General: Alert, Oriented, Cooperative, Other (Cachectic appearing).  No: Mild 

Distress


HEENT: Conjunctiva Clear.  No: Mucosa Moist & Pink (dry), Scleral Icterus


Neck: Supple, Trachea Midline.  No: Lymphadenopathy


Lungs: Clear to Auscultation, Normal Respiratory Effort


Cardiovascular: Regular Rate, Regular Rhythm.  No: Systolic Murmur


GI/Abdominal Exam: Normal Bowel Sounds, Soft, No Distention, Tender, Other (

Gastric tube in the mid abdomen)


Back Exam: Normal Inspection, Full Range of Motion


Extremities: No Pedal Edema.  No: Increased Warmth


Skin: Warm, Dry


Neuro Extensive - Mental Status: Alert, Oriented x3, Nl Response to Commands


Neuro Extensive - Motor, Sensory, Reflexes: CN II-XII Intact.  No: Dysarthria, 

Abnormal Motor, Tremor


Psychiatric: Alert, Normal Affect





- Patient Data


Lab Results Last 24 hrs: 


 Laboratory Results - last 24 hr











  09/25/18 09/25/18 Range/Units





  04:00 04:00 


 


WBC  7.2   (4.5-11.0)  K/uL


 


RBC  3.03 L   (3.30-5.50)  M/uL


 


Hgb  9.6 L   (12.0-15.0)  g/dL


 


Hct  30.1 L   (36.0-48.0)  %


 


MCV  99 H   (80-98)  fL


 


MCH  32 H   (27-31)  pg


 


MCHC  32   (32-36)  %


 


Plt Count  473 H   (150-400)  K/uL


 


Sodium   138 L  (140-148)  mmol/L


 


Potassium   4.2  (3.6-5.2)  mmol/L


 


Chloride   108  (100-108)  mmol/L


 


Carbon Dioxide   18 L  (21-32)  mmol/L


 


Anion Gap   16.2 H  (5.0-14.0)  mmol/L


 


BUN   27 H  (7-18)  mg/dL


 


Creatinine   0.6  (0.6-1.0)  mg/dL


 


Est Cr Clr Drug Dosing   70.40  mL/min


 


Estimated GFR (MDRD)   > 60  (>60)  


 


Glucose   96  ()  mg/dL


 


Calcium   8.8  (8.5-10.1)  mg/dL


 


Phosphorus   5.9 H  (2.5-4.9)  mg/dL


 


Magnesium   1.9  (1.8-2.4)  mg/dL


 


Total Bilirubin   0.3  (0.2-1.0)  mg/dL


 


AST   27  (15-37)  U/L


 


ALT   44  (12-78)  U/L


 


Alkaline Phosphatase   197 H  ()  U/L


 


Total Protein   6.4  (6.4-8.2)  g/dL


 


Albumin   2.3 L  (3.4-5.0)  g/dL


 


Globulin   4.1 H  (2.3-3.5)  g/dL


 


Albumin/Globulin Ratio   0.6 L  (1.2-2.2)  











Result Diagrams: 


 09/25/18 04:00





 09/25/18 04:00





Consult PN Assessment/Plan


Procedures: 


Procedures





AGENT NOS ASSAY W/OPTIC (12/08/15)


AIRWAY INHALATION TREATMENT (09/10/18)


ASSAY OF AMYLASE (08/18/18)


ASSAY OF ETHANOL (10/29/13)


ASSAY OF GASTRIN (10/29/13)


ASSAY OF LACTIC ACID (08/18/18)


ASSAY OF LIPASE (08/18/18)


ASSAY OF MAGNESIUM (09/10/18)


ASSAY OF NATRIURETIC PEPTIDE (10/29/13)


ASSAY OF PHOSPHORUS (09/10/18)


ASSAY OF SERUM POTASSIUM (12/23/15)


ASSAY THYROID STIM HORMONE (12/08/15)


BLOOD CULTURE FOR BACTERIA (10/29/13)


BLOOD GASES ANY COMBINATION (10/29/13)


BLOOD TRANSFUSION SERVICE (09/10/18)


BLOOD TYPING SEROLOGIC ABO (09/10/18)


BLOOD TYPING SEROLOGIC RH(D) (09/10/18)


C DIFF AMPLIFIED PROBE (12/08/15)


C-REACTIVE PROTEIN (08/18/18)


CHEST X-RAY 1 VIEW FRONTAL (10/29/13)


CHEST X-RAY 2VW FRONTAL&LATL (04/01/14)


COMP SCREEN MAMMOGRAM ADD-ON (08/26/16)


COMPATIBILITY TEST ANTIGLOB (09/10/18)


COMPATIBILITY TEST SPIN (09/10/18)


COMPLETE CBC AUTOMATED (09/10/18)


COMPLETE CBC W/AUTO DIFF WBC (08/18/18)


COMPREHEN METABOLIC PANEL (09/10/18)


CT ABD & PELV W/CONTRAST (08/18/18)


CT ABD & PELVIS W/O CONTRAST (12/08/15)


CULTR BACTERIA EXCEPT BLOOD (10/29/13)


CULTURE AEROBIC IDENTIFY (12/08/15)


CULTURE OTHR SPECIMN AEROBIC (04/01/14)


CULTURE SCREEN ONLY (09/07/18)


DRUG TEST PRSMV DIR OPT OBS (09/10/18)


EGD BIOPSY SINGLE/MULTIPLE (09/07/18)


ELECTROCARDIOGRAM REPORT (12/08/15)


ELECTROCARDIOGRAM TRACING (12/08/15)


EMERGENCY DEPT VISIT (08/18/18)


EMERGENCY DEPT VISIT (08/07/18)


EMERGENCY DEPT VISIT (07/18/17)


EMERGENCY DEPT VISIT (12/08/15)


EMERGENCY DEPT VISIT (12/08/15)


EMERGENCY DEPT VISIT (10/29/13)


EMERGENCY DEPT VISIT (10/29/13)


GIARDIA AG IA (12/08/15)


HELICOBACTER PYLORI ANTIBODY (10/29/13)


HEMATOCRIT (10/29/13)


HEMOGLOBIN (10/29/13)


HYDRATE IV INFUSION ADD-ON (08/18/18)


IMMUNOHISTO ANTB 1ST STAIN (09/10/18)


IMMUNOHISTO ANTB ADDL SLIDE (09/10/18)


INSERT PICC CATH (10/29/13)


MEASURE BLOOD OXYGEN LEVEL (09/10/18)


METABOLIC PANEL TOTAL CA (09/10/18)


MICROBE SUSCEPTIBLE BRITTNY (12/08/15)


POS AIRWAY PRESSURE CPAP (10/29/13)


PROTHROMBIN TIME (12/08/15)


PT EVAL MOD COMPLEX 30 MIN (09/10/18)


PT EVALUATION (10/29/13)


RBC ANTIBODY SCREEN (09/10/18)


ROUTINE VENIPUNCTURE (09/10/18)


SMEAR GRAM STAIN (04/01/14)


STOOL CULTR AEROBIC BACT EA (12/08/15)


THER/PROPH/DIAG INJ IV PUSH (08/18/18)


THER/PROPH/DIAG INJ SC/IM (08/07/18)


THER/PROPH/DIAG IV INF ADDON (12/08/15)


THER/PROPH/DIAG IV INF INIT (12/08/15)


THERAPEUTIC ACTIVITIES (10/29/13)


THERAPEUTIC EXERCISES (10/29/13)


TISSUE EXAM BY PATHOLOGIST (09/10/18)


TISSUE EXAM BY PATHOLOGIST (09/10/18)


TISSUE EXAM BY PATHOLOGIST (04/01/14)


TISSUE EXAM BY PATHOLOGIST (04/01/14)


TTE F-UP OR LMTD (10/29/13)


TX/PRO/DX INJ NEW DRUG ADDON (08/18/18)


TX/PRO/DX INJ SAME DRUG ADON (10/29/13)


URINALYSIS AUTO W/SCOPE (08/18/18)


URINE BACTERIA CULTURE (12/08/15)


URINE CULTURE/COLONY COUNT (12/08/15)


US URINE CAPACITY MEASURE (09/10/18)


WITHDRAWAL OF ARTERIAL BLOOD (10/29/13)


X-RAY EXAM CHEST 1 VIEW (09/10/18)


X-RAY EXAM CHEST 2 VIEWS (08/18/18)


X-RAY EXAM OF ABDOMEN (12/08/15)


X-RAY EXAM OF ANKLE (03/07/18)


X-RAY EXAM OF FOOT (03/07/18)


X-RAY EXAM OF SHOULDER (08/07/18)


X-RAY EXAM OF SPINE 1 VIEW (08/18/18)


X-RAY UPPER GI DELAY W/O KUB (09/10/18)








Problem List Initiated/Reviewed/Updated: Yes


My Orders Last 24 Hours: 


My Active Orders





09/25/18 14:40


UA W/O MICROSCOPIC [URIN] Routine 











Plan: 





ASSESSMENT AND PLAN





Metabolic acidosis with elevated anion gap - mild at this time. I suspect 

malnutrition as the likely underlying cause with ketosis and slight increase in 

pedal acids. There is no evidence to suspect sepsis and lactic acidosis. Renal 

tubular acidosis could be considered but she has a normal potassium making them 

less likely. She is otherwise clinically improving and has only recently had 

her diet advanced and nutrition improving. She is clinically stable and doing 

well at this time. She has normal renal function.


-Urinalysis to look for pH and ketones


-I would recommend outpatient follow-up and repeat laboratory testing after 

nutrition has improved and in stable for a couple of weeks. If the acidosis 

persists then a more aggressive workup for RTA could be considered.





Dexter Shaffer M.D.


Requesting Provider: Dr. Jensen


Date Consult Requested: 09/25/18


Reason for Consult: Metabolic acidosis


Patient History Reviewed: Yes


Admission H&P Reviewed: Yes


Notified Requestor: No


Time Spent (in minutes): 50

## 2018-09-26 VITALS — SYSTOLIC BLOOD PRESSURE: 129 MMHG | DIASTOLIC BLOOD PRESSURE: 68 MMHG

## 2018-09-26 RX ADMIN — LIDOCAINE AND PRILOCAINE SCH APPLIC: 25; 25 CREAM TOPICAL at 11:18

## 2018-09-26 RX ADMIN — OXYCODONE HYDROCHLORIDE AND ACETAMINOPHEN PRN TAB: 5; 325 TABLET ORAL at 10:38

## 2018-09-26 RX ADMIN — MOMETASONE FUROATE AND FORMOTEROL FUMARATE DIHYDRATE SCH PUFF: 200; 5 AEROSOL RESPIRATORY (INHALATION) at 07:37

## 2018-09-26 RX ADMIN — OXYCODONE HYDROCHLORIDE AND ACETAMINOPHEN PRN TAB: 5; 325 TABLET ORAL at 06:28

## 2018-09-26 RX ADMIN — LEUCINE, PHENYLALANINE, LYSINE, METHIONINE, ISOLEUCINE, VALINE, HISTIDINE, THREONINE, TRYPTOPHAN, ALANINE, GLYCINE, ARGININE, PROLINE, SERINE, TYROSINE, SODIUM ACETATE, DIBASIC POTASSIUM PHOSPHATE, MAGNESIUM CHLORIDE, SODIUM CHLORIDE, CALCIUM CHLORIDE, DEXTROSE SCH
365; 280; 290; 200; 300; 290; 240; 210; 90; 1035; 515; 575; 340; 250; 20; 340; 261; 51; 59; 33; 15 INJECTION INTRAVENOUS at 10:43

## 2018-09-26 RX ADMIN — OXYCODONE HYDROCHLORIDE AND ACETAMINOPHEN PRN TAB: 5; 325 TABLET ORAL at 01:55

## 2018-09-26 RX ADMIN — HEPARIN SODIUM (PORCINE) LOCK FLUSH IV SOLN 100 UNIT/ML PRN UNITS: 100 SOLUTION at 04:12

## 2018-09-26 NOTE — DISCH
ADMISSION DIAGNOSES:

1. Abdominal pain.

2. Dehydration.

3. Malnutrition.

4. 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

5. Nicotine addiction.

6. History of alcohol abuse.

7. Hypertension.

8. Chronic obstructive pulmonary disease.

9. Hyperlipidemia.

10.Recent fracture of left clavicle.

 

DISCHARGE DIAGNOSES:

1. Resolution of abdominal pain.

2. Resolution of dehydration and malnutrition.

3. Status post partial gastrectomy on 09/10/2018.

4. Nicotine addiction.

5. Hypertension.

6. Chronic obstructive pulmonary disease.

7. History of alcohol abuse.

8. Hyperlipidemia.

9. Recent fracture of left clavicle.

10.Persistent metabolic imbalance, thought to be secondary to malnutrition.

11.One unit of packed red blood cell transfused for a hemoglobin of 8.3.

 

HISTORY:  Maureen Valdez is a 62-year-old female, who had a partial gastrectomy on 09/10/2018.

She was discharged on 09/15/2018 and was readmitted on 09/18/2018.  She was started on TPN,

IV fluids.  On day #1, her TPN was tapered.  She received physical therapy.  Labs were

evaluated daily and they remained within normal limits.  She does have a gastrostomy tube,

and on 09/20, she had a lot of pain around that, ambulation was encouraged as well as oral

intake.  Labs remained to be monitored.  On 09/21, PCA was discontinued, she was started on

Percocet.  On 09/22, she was having quite a bit of pain around the gastrostomy tube that

radiated into her back.  She did not like the milk-based protein supplements.  She was

started on lidocaine-prilocaine ointment to put around the gastrostomy tube site.  Atarax 25

mg q.4 hours was added for additional pain, and her gastrostomy tube was flushed with 100 mL

twice a day to make sure it stayed patent, and she was switched to clear Ensure for protein

supplements.  On 09/23, she continued to improve.  Continued with TPN and encouraged oral

intake.  On 09/24, hemoglobin was 8.3.  She was given 1 unit of packed red blood cells and

TPN was decreased due to an increase in oral intake.  On 09/25, her TPN was decreased to 40

mL/h.  She had a Respiratory Therapy consult to rule out her need for home O2, and she had

discharge planning meet with her in regard to home health care, occupational therapy, and

physical therapy.  Hospitalist was consulted in regard to her chronic metabolic imbalance.

On 09/26/2018, Maureen was able to be discharged to home.

 

REVIEW OF SYSTEMS:  CONSTITUTIONAL:  No fever, chills, night sweats, or fatigue.  Weight is

up 1 pound.

HEENT:  Negative.

NECK:  Negative.

HEART:  No chest pain, shortness of breath, fast or irregular heartbeat.

LUNGS:  No cough.  Does get short of breath, per nursing staff thought to be associated with

anxiety.

ABDOMEN:  Last bowel movement yesterday.  Oral intake 1050.  Urine output 2075.  She

consumed 80% of breakfast, 80% of lunch, and 75% of dinner.

MUSCULOSKELETAL:  Remains to report chronic back pain.  She is taking Percocet 5/325 mg two

of them every 4 hours, alternating with Atarax 25 mg.  Her abdominal pain is at the site of

the gastrostomy tube and we will send her home with lidocaine which is helping with that.

She also has a StatLock on.

:  Negative.

SKIN:  Without rash.

NEURO:  Intact.

PSYCHIATRIC:  Mood and affect good today.

 

Remainder of review of systems negative for any pertinent positives and negatives.

 

OBJECTIVE:  GENERAL:  Maureen Valdez is a 62-year-old female.

VITAL SIGNS:  Height is 5 feet 2.9 inches, weight is 94 pounds, BMI 16.  TPR; 97.6, 107, 18.

Blood pressure 134/70.

HEENT:  Negative.

NECK:  Supple.

HEART:  Regular rate and rhythm.

LUNGS:  Clear.

ABDOMEN:  Midline incision, has Steri-Strips, healing well.  There is no redness.  Faint

pinkness and small amount of yellow drainage noted around the gastrostomy tube, nonacute.

Gastrostomy tube is plugged.  Abdominal binder has been on.

EXTREMITIES:  Without peripheral edema.

NEURO:  Intact.

SKIN:  Without rash.

PSYCHIATRIC:  Mood and affect appropriate.

 

DISPOSITION:  Discharged to home with home health care through [facility].

 

FOLLOWUP APPOINTMENT:  With Erick Jensen M.D., on 10/03/2018 at 9:00 a.m.; to come to

clinic at 08:30 for CMP and CBC.

 

DISCHARGE MEDICATIONS:  New prescriptions;

1. Percocet 5/325 mg 1 to 2 oral q.4 hours p.r.n. pain, #40.  She has several at home.

2. DuoNeb, use 3 mL inhalation every 4 hours p.r.n., #15.

3. Atarax, hydroxyzine, 25 mg oral q.4 hours p.r.n., #30.

4. Send the lidocaine-prilocaine (EMLA cream) home with the patient, which is at bedside

    to use b.i.d.

 

She is to resume her home medications of Advair 250/50 one puff twice daily, pantoprazole 20

mg oral before breakfast, and losartan-hydrochlorothiazide 100/12.5 p.o. daily.

 

DIET AFTER DISCHARGE:  Drink 8 to 10 glasses of water a day.  GI soft diet, low residue.

 

ACTIVITY:  No lifting over 10 pounds for 4 weeks.  Other activity; walk at least 5 times

daily inside your home.  Driving; do not drive on narcotic pain medication.  Shower/bathing;

may shower.

 

DISCHARGE INSTRUCTIONS:  Notify provider if any fever, increased pain, nausea, or vomiting.

Keep site clean and dry.  Keep G-tube clamped.  Keep a record of food and liquid intake, and

drink 3 clear Ensures daily.

## 2018-10-02 ENCOUNTER — HOSPITAL ENCOUNTER (EMERGENCY)
Dept: HOSPITAL 11 - JP.ED | Age: 63
Discharge: HOME | End: 2018-10-02
Payer: MEDICARE

## 2018-10-02 VITALS — DIASTOLIC BLOOD PRESSURE: 84 MMHG | SYSTOLIC BLOOD PRESSURE: 159 MMHG

## 2018-10-02 DIAGNOSIS — I10: ICD-10-CM

## 2018-10-02 DIAGNOSIS — Z79.899: ICD-10-CM

## 2018-10-02 DIAGNOSIS — Z88.8: ICD-10-CM

## 2018-10-02 DIAGNOSIS — F17.210: ICD-10-CM

## 2018-10-02 DIAGNOSIS — Z88.5: ICD-10-CM

## 2018-10-02 DIAGNOSIS — K91.870: Primary | ICD-10-CM

## 2018-10-02 NOTE — EDM.PDOC
ED HPI GENERAL MEDICAL PROBLEM





- General


Chief Complaint: Abdominal Pain


Stated Complaint: COMPLICATION FROM ULCER SURGERY


Time Seen by Provider: 10/02/18 14:00


Source of Information: Reports: Patient, Family


History Limitations: Reports: No Limitations





- History of Present Illness


INITIAL COMMENTS - FREE TEXT/NARRATIVE: 





62-year-old female had a hernia repair within the last week, ran out of her 

pain medication, found an "empty bottle under the table". She was doing better 

and only taking Tylenol for the last 2 days but today she is having increased 

pain. No fevers or chills, no vomiting but has had some loose stools over the 

past 2 days.


Onset: Gradual


Severity: Moderate


Associated Symptoms: Reports: Other (Diarrhea)


  ** Left Abdomen


Pain Score (Numeric/FACES): 10





- Related Data


 Allergies











Allergy/AdvReac Type Severity Reaction Status Date / Time


 


codeine Allergy Unknown Hives Verified 10/02/18 13:41


 


alendronate sodium Allergy  Rash Verified 10/02/18 13:41





[From Fosamax]     


 


celecoxib Allergy  Rash Verified 10/02/18 13:41


 


cortisone Allergy  Hives Verified 10/02/18 13:41


 


pentazocine Allergy  Rash Verified 10/02/18 13:41


 


propoxyphene Allergy  Rash Verified 10/02/18 13:41


 


alendronic acid Allergy  Hives Uncoded 10/02/18 13:41











Home Meds: 


 Home Meds





Losartan/Hydrochlorothiazide [Losartan-HCTZ 100-12.5 MG] 12.5 - 100 mg PO DAILY 

08/07/18 [History]


Albuterol Sulfate [Proair Hfa] 2 puff PO Q4H PRN 08/21/18 [History]


Fluticasone/Salmeterol [Advair 250-50 Diskus] 1 puff PO BID PRN 08/21/18 [

History]


Pantoprazole 20 mg PO ACBREAKFAST 08/23/18 [History]


amLODIPine [Norvasc] 5 mg PO DAILY 09/06/18 [History]


Acetaminophen/oxyCODONE [Percocet 325-5 MG] 1 - 2 tab PO Q4H PRN #20 tablet 09/ 26/18 [Rx]


Albuterol/Ipratropium [DuoNeb 3.0-0.5 MG/3 ML] 3 ml INH Q4H PRN #15 neb 09/26/ 18 [Rx]


Lidocaine/Prilocaine [EMLA Crm] 0 gm TOP BID  tube 09/26/18 [Rx]


hydrOXYzine HCl [hydrOXYzine] 25 mg PO Q4H PRN #30 tablet 09/26/18 [Rx]











Past Medical History


HEENT History: Reports: Cataract, Other (See Below)


Other HEENT History: wears glasses


Cardiovascular History: Reports: Hypertension, Other (See Below)


Other Cardiovascular History: orthostatic hypotension


Respiratory History: Reports: COPD


Gastrointestinal History: Reports: Cholelithiasis, GERD, GI Bleed


Other Gastrointestinal History: perforated ulcer 2013


OB/GYN History: Reports: Pregnancy


Musculoskeletal History: Reports: Arthritis, Back Pain, Chronic, Other (See 

Below)


Other Musculoskeletal History: Recent fx collar bone


Psychiatric History: Reports: Addiction, Other (See Below)


Other Psychiatric History: ETOH addiction


Hematologic History: Reports: Blood Transfusion(s)





- Infectious Disease History


Infectious Disease History: Reports: Chicken Pox, Measles, Mumps





- Past Surgical History


HEENT Surgical History: Reports: Cataract Surgery, Oral Surgery


Cardiovascular Surgical History: Reports: None


GI Surgical History: Reports: Cholecystectomy, EGD, Hernia, Abdominal, Other (

See Below)


Other GI Surgeries/Procedures: Ulcer surgery Sept 2018


Female  Surgical History: Reports: Tubal Ligation


Musculoskeletal Surgical History: Reports: Arthroscopic Knee





Social & Family History





- Family History


HEENT: Reports: Macular Degeneration


Other HEENT Family History: mother


Cardiac: Reports: Hypertension


Other Cardiac Family History: both parents had hypertension


Respiratory: Reports: COPD


Other Respiratory Family Hisory: father had emphysema


Endocrine/Metabolic: Reports: Diabetes, type II





- Tobacco Use


Smoking Status *Q: Current Every Day Smoker


Years of Tobacco use: 45


Packs/Tins Daily: 0.5


Used Tobacco, but Quit: No


Second Hand Smoke Exposure: Yes





- Caffeine Use


Caffeine Use: Reports: Coffee


Caffeine Use Comment: Occasional rare cup of coffee





- Recreational Drug Use


Recreational Drug Use: No





ED ROS GENERAL





- Review of Systems


Review Of Systems: See Below


Constitutional: Reports: Malaise, Decreased Appetite.  Denies: Fever, Chills


HEENT: Reports: No Symptoms


Respiratory: Reports: Shortness of Breath


Cardiovascular: Denies: Chest Pain


GI/Abdominal: Reports: Abdominal Pain, Diarrhea, Nausea.  Denies: Vomiting


: Reports: No Symptoms


Musculoskeletal: Reports: Back Pain


Skin: Reports: Bruising (Abdominal bruising from her recent surgery)





ED EXAM, GI/ABD





- Physical Exam


Exam: See Below


Exam Limited By: No Limitations


General Appearance: Alert, No Apparent Distress (Looks uncomfortable but not 

distressed)


Eyes: Bilateral: Normal Appearance (No jaundice)


Head: Atraumatic


Respiratory/Chest: No Respiratory Distress, Lungs Clear


Cardiovascular: Regular Rate, Rhythm


GI/Abdominal Exam: Abnormal Bowel Sounds (Bowel sounds are hypoactive), Other (

Incision looks excellent, there is some postoperative bruising present which 

would be expected.)


Extremities: No: Pedal Edema


Neurological: Alert, Oriented


Psychiatric: Flat Affect





Course





- Vital Signs


Last Recorded V/S: 


 Last Vital Signs











Temp  96.2 F   10/02/18 13:54


 


Pulse  104 H  10/02/18 13:54


 


Resp  16   10/02/18 13:54


 


BP  159/84 H  10/02/18 13:54


 


Pulse Ox  100   10/02/18 13:54














- Orders/Labs/Meds


Labs: 


 Laboratory Tests











  10/02/18 10/02/18 10/02/18 Range/Units





  14:23 14:23 14:23 


 


WBC  9.7    (4.5-11.0)  K/uL


 


RBC  3.73    (3.30-5.50)  M/uL


 


Hgb  12.0    (12.0-15.0)  g/dL


 


Hct  35.5 L    (36.0-48.0)  %


 


MCV  95    (80-98)  fL


 


MCH  32 H    (27-31)  pg


 


MCHC  34    (32-36)  %


 


Plt Count  577 H    (150-400)  K/uL


 


Neut % (Auto)  80 H    (36-66)  %


 


Lymph % (Auto)  11 L    (24-44)  %


 


Mono % (Auto)  6    (2-6)  %


 


Eos % (Auto)  2    (2-4)  %


 


Baso % (Auto)  0    (0-1)  %


 


Sodium   139 L   (140-148)  mmol/L


 


Potassium   3.3 L   (3.6-5.2)  mmol/L


 


Chloride   107   (100-108)  mmol/L


 


Carbon Dioxide   15 L   (21-32)  mmol/L


 


Anion Gap   20.3 H   (5.0-14.0)  mmol/L


 


BUN   43 H   (7-18)  mg/dL


 


Creatinine   0.9   (0.6-1.0)  mg/dL


 


Est Cr Clr Drug Dosing   42.39   mL/min


 


Estimated GFR (MDRD)   > 60   (>60)  


 


Glucose   99   ()  mg/dL


 


Lactic Acid    1.0  (0.4-2.0)  mmol/L


 


Calcium   8.7   (8.5-10.1)  mg/dL


 


Total Bilirubin   0.3   (0.2-1.0)  mg/dL


 


AST   147 H D   (15-37)  U/L


 


ALT   325 H   (12-78)  U/L


 


Alkaline Phosphatase   242 H   ()  U/L


 


Total Protein   7.5   (6.4-8.2)  g/dL


 


Albumin   3.0 L   (3.4-5.0)  g/dL


 


Globulin   4.5 H   (2.3-3.5)  g/dL


 


Albumin/Globulin Ratio   0.7 L   (1.2-2.2)  


 


Lipase   296   ()  U/L














- Re-Assessments/Exams


Free Text/Narrative Re-Assessment/Exam: 





10/02/18 14:23


CBC, CMP and lipase were obtained.


10/02/18 14:58


WBC was normal, LFTs were elevated but electrolytes were reasonably normal. 

Lipase was normal. Discussed her condition with Dr. Jensen, she has an 

appointment with him tomorrow.


10/02/18 15:08


After discussing the patient's condition with Dr. Jensen, she was given 10 

additional doses of Percocet for pain control and will see him tomorrow.





Departure





- Departure


Time of Disposition: 15:28


Disposition: Home, Self-Care 01


Condition: Fair


Clinical Impression: 


Abdominal pain


Qualifiers:


 Abdominal location: generalized Qualified Code(s): R10.84 - Generalized 

abdominal pain








- Discharge Information


Instructions:  Abdominal Pain, Adult, Easy-to-Read


Referrals: 


German Lockett NP [Primary Care Provider] - 


Forms:  ED Department Discharge


Care Plan Goals: 


Continue your current medications along with pain medications as prescribed. 

See Dr. Jensen tomorrow as scheduled. Return sooner if worsening such as fever 

or increased pain not responding to pain medication.

## 2021-03-20 NOTE — EDM.PDOC
ED HPI GENERAL MEDICAL PROBLEM





- General


Chief Complaint: Abdominal Pain


Stated Complaint: ABD PAIN


Time Seen by Provider: 08/18/18 22:10


Source of Information: Reports: Patient, Family


History Limitations: Reports: No Limitations





- History of Present Illness


INITIAL COMMENTS - FREE TEXT/NARRATIVE: 





pt has a etoh addiction and has not drank for 2 weeks. She did have a bad fall 

2 weeks ago and she has not drank since that time. She has been going to aa 

meetings. 


Onset: Other ( started yesterday and has been very severe today. She is rating 

the pain a 10. )


Duration: Hour(s):


Location: Reports: Abdomen


Associated Symptoms: Reports: Other (pt has had markedly decreased appetite. )


  ** Abdomen


Pain Score (Numeric/FACES): 10





- Related Data


 Allergies











Allergy/AdvReac Type Severity Reaction Status Date / Time


 


codeine Allergy Unknown Hives Verified 08/18/18 22:05


 


cortisone Allergy  Hives Verified 08/18/18 22:05











Home Meds: 


 Home Meds





Losartan/Hydrochlorothiazide [Losartan-HCTZ 100-12.5 MG] 1 each PO DAILY 08/07/ 18 [History]


Acetaminophen/traMADol [Ultracet] 1 tab PO Q6H PRN 08/18/18 [History]











Past Medical History


HEENT History: Reports: Cataract, Other (See Below)


Other HEENT History: wears glasses


Cardiovascular History: Reports: Hypertension


Respiratory History: Reports: COPD


Gastrointestinal History: Reports: Cholelithiasis, GERD, GI Bleed


Other Gastrointestinal History: perforated ulcer 2013


OB/GYN History: Reports: Pregnancy


Musculoskeletal History: Reports: Arthritis, Back Pain, Chronic, Other (See 

Below)


Other Musculoskeletal History: Recent fx collar bone


Psychiatric History: Reports: Addiction, Other (See Below)


Other Psychiatric History: ETOH addiction


Hematologic History: Reports: Blood Transfusion(s)





- Infectious Disease History


Infectious Disease History: Reports: Chicken Pox, Measles, Mumps





- Past Surgical History


HEENT Surgical History: Reports: Cataract Surgery, Oral Surgery


Cardiovascular Surgical History: Reports: None


GI Surgical History: Reports: Cholecystectomy, Hernia, Abdominal


Female  Surgical History: Reports: Tubal Ligation


Musculoskeletal Surgical History: Reports: Arthroscopic Knee





Social & Family History





- Family History


HEENT: Reports: Macular Degeneration


Other HEENT Family History: mother


Cardiac: Reports: Hypertension


Other Cardiac Family History: both parents had hypertension


Respiratory: Reports: COPD


Other Respiratory Family Hisory: father had emphysema


Endocrine/Metabolic: Reports: Diabetes, type II





- Tobacco Use


Smoking Status *Q: Current Every Day Smoker


Years of Tobacco use: 16


Packs/Tins Daily: 0.5


Used Tobacco, but Quit: No


Second Hand Smoke Exposure: Yes





- Caffeine Use


Caffeine Use: Reports: Coffee





- Alcohol Use


Date of Last Drink: 07/28/18





- Recreational Drug Use


Recreational Drug Use: No





ED ROS GENERAL





- Review of Systems


Review Of Systems: See Below


Constitutional: Reports: Weakness, Decreased Appetite


HEENT: Reports: No Symptoms


Respiratory: Reports: No Symptoms


Cardiovascular: Reports: No Symptoms


Endocrine: Reports: No Symptoms


GI/Abdominal: Reports: Abdominal Pain, Other (pt is having severe left sided 

abdomanal pain. Her appetite is down . She has had a bms. She is not vomiting. )





ED EXAM, GI/ABD





- Physical Exam


Exam: See Below


Text/Narrative:: 





pt arrived with pain in the left abdoman.  She has been having bms. She is not 

vomiting. 


Exam Limited By: No Limitations


General Appearance: Alert, Moderate Distress


Ears: Normal TMs


Nose: Normal Inspection


Throat/Mouth: Normal Inspection


Head: Atraumatic


Neck: Normal Inspection


Respiratory/Chest: No Respiratory Distress


Cardiovascular: Regular Rate, Rhythm, Tachycardia


GI/Abdominal Exam: Tender, Other (pt has left sided abdomanl tenderness with 

some degree of guarding. )


 (Female) Exam: Deferred


Rectal (Female) Exam: Deferred


Back Exam: Normal Inspection, Other ( Pt states her entire back is hurting. )


Extremities: Normal Inspection


Neurological: Alert, Oriented, Normal Cognition





Course





- Vital Signs


Last Recorded V/S: 


 Last Vital Signs











Temp  36.2 C   08/18/18 22:11


 


Pulse  111 H  08/18/18 22:11


 


Resp  18   08/18/18 22:11


 


BP  200/118 H  08/18/18 22:11


 


Pulse Ox  100   08/18/18 22:11














- Orders/Labs/Meds


Orders: 


 Active Orders 24 hr











 Category Date Time Status


 


 Abdomen Pelvis w Cont [CT] Stat Exams  08/18/18 22:52 Taken


 


 Chest 2V [CR] Stat Exams  08/18/18 22:52 Taken


 


 Thoracic Spine 1V [CR] Stat Exams  08/19/18 00:14 Taken


 


 UA W/MICROSCOPIC [URIN] Urgent Lab  08/18/18 22:07 Ordered


 


 Sodium Chloride 0.9% [Normal Saline] 1,000 ml Med  08/18/18 22:45 Active





 IV ASDIRECTED   


 


 Sodium Chloride 0.9% [Normal Saline] 1,000 ml Med  08/18/18 23:45 Active





 IV ASDIRECTED   


 


 Sodium Chloride 0.9% [Saline Flush] Med  08/18/18 22:31 Active





 10 ml FLUSH ASDIRECTED PRN   


 


 Sodium Chloride 0.9% [Saline Flush] Med  08/18/18 23:06 Active





 10 ml FLUSH ONETIME PRN   


 


 Saline Lock Insert [OM.PC] Routine Oth  08/18/18 22:31 Ordered








 Medication Orders





Sodium Chloride (Normal Saline)  1,000 mls @ 500 mls/hr IV ASDIRECTED RED


   Last Admin: 08/18/18 22:40  Dose: 500 mls/hr


Sodium Chloride (Normal Saline)  1,000 mls @ 400 mls/hr IV ASDIRECTED RED


   Last Admin: 08/19/18 00:39  Dose: 400 mls/hr


Sodium Chloride (Saline Flush)  10 ml FLUSH ASDIRECTED PRN


   PRN Reason: Keep Vein Open


   Last Admin: 08/18/18 22:40  Dose: 10 ml


Sodium Chloride (Saline Flush)  10 ml FLUSH ONETIME PRN


   PRN Reason: PER RADIOLOGY PROTOCOL


   Last Admin: 08/18/18 23:24  Dose: 10 ml








Labs: 


 Laboratory Tests











  08/18/18 08/18/18 08/18/18 Range/Units





  22:07 22:17 22:17 


 


WBC    7.6  (4.5-11.0)  K/uL


 


RBC    3.43  (3.30-5.50)  M/uL


 


Hgb    11.9 L D  (12.0-15.0)  g/dL


 


Hct    35.1 L  (36.0-48.0)  %


 


MCV    102 H  (80-98)  fL


 


MCH    35 H  (27-31)  pg


 


MCHC    34  (32-36)  %


 


Plt Count    441 H  (150-400)  K/uL


 


Neut % (Auto)    72 H  (36-66)  %


 


Lymph % (Auto)    18 L  (24-44)  %


 


Mono % (Auto)    8 H  (2-6)  %


 


Eos % (Auto)    2  (2-4)  %


 


Baso % (Auto)    0  (0-1)  %


 


Sodium     (140-148)  mmol/L


 


Potassium     (3.6-5.2)  mmol/L


 


Chloride     (100-108)  mmol/L


 


Carbon Dioxide     (21-32)  mmol/L


 


Anion Gap     (5.0-14.0)  mmol/L


 


BUN     (7-18)  mg/dL


 


Creatinine     (0.6-1.0)  mg/dL


 


Est Cr Clr Drug Dosing     mL/min


 


Estimated GFR (MDRD)     (>60)  


 


Glucose     ()  mg/dL


 


Lactic Acid   0.6   (0.4-2.0)  mmol/L


 


Calcium     (8.5-10.1)  mg/dL


 


Total Bilirubin     (0.2-1.0)  mg/dL


 


AST     (15-37)  U/L


 


ALT     (12-78)  U/L


 


Alkaline Phosphatase     ()  U/L


 


C-Reactive Protein     (0.0-0.3)  mg/dL


 


Total Protein     (6.4-8.2)  g/dL


 


Albumin     (3.4-5.0)  g/dL


 


Globulin     (2.3-3.5)  g/dL


 


Albumin/Globulin Ratio     (1.2-2.2)  


 


Amylase     ()  U/L


 


Lipase     ()  U/L


 


Urine Color  Yellow    


 


Urine Appearance  Clear    


 


Urine pH  6.0    (4.5-8.0)  


 


Ur Specific Gravity  1.010    (1.008-1.030)  


 


Urine Protein  Negative    (NEGATIVE)  mg/dL


 


Urine Glucose (UA)  Negative    (NEGATIVE)  mg/dL


 


Urine Ketones  15 H    (NEGATIVE)  mg/dL


 


Urine Occult Blood  Negative    (NEGATIVE)  


 


Urine Nitrite  Negative    (NEGATIVE)  


 


Urine Bilirubin  Negative    (NEGATIVE)  


 


Urine Urobilinogen  1    (NORMAL)  mg/dL


 


Ur Leukocyte Esterase  Negative    (NEGATIVE)  


 


Urine RBC  Not seen    (0-5)  


 


Urine WBC  Not seen    (0-5)  


 


Ur Epithelial Cells  Few    


 


Amorphous Sediment  Not seen    


 


Urine Bacteria  Not seen    


 


Urine Mucus  Not seen    














  08/18/18 08/18/18 08/18/18 Range/Units





  22:17 22:17 22:49 


 


WBC     (4.5-11.0)  K/uL


 


RBC     (3.30-5.50)  M/uL


 


Hgb     (12.0-15.0)  g/dL


 


Hct     (36.0-48.0)  %


 


MCV     (80-98)  fL


 


MCH     (27-31)  pg


 


MCHC     (32-36)  %


 


Plt Count     (150-400)  K/uL


 


Neut % (Auto)     (36-66)  %


 


Lymph % (Auto)     (24-44)  %


 


Mono % (Auto)     (2-6)  %


 


Eos % (Auto)     (2-4)  %


 


Baso % (Auto)     (0-1)  %


 


Sodium  131 L    (140-148)  mmol/L


 


Potassium  3.5 L    (3.6-5.2)  mmol/L


 


Chloride  100    (100-108)  mmol/L


 


Carbon Dioxide  17 L    (21-32)  mmol/L


 


Anion Gap  17.5 H    (5.0-14.0)  mmol/L


 


BUN  26 H D    (7-18)  mg/dL


 


Creatinine  0.9  D    (0.6-1.0)  mg/dL


 


Est Cr Clr Drug Dosing  45.02    mL/min


 


Estimated GFR (MDRD)  > 60    (>60)  


 


Glucose  107 H    ()  mg/dL


 


Lactic Acid     (0.4-2.0)  mmol/L


 


Calcium  8.7  D    (8.5-10.1)  mg/dL


 


Total Bilirubin  0.5    (0.2-1.0)  mg/dL


 


AST  14 L D    (15-37)  U/L


 


ALT  34    (12-78)  U/L


 


Alkaline Phosphatase  156 H    ()  U/L


 


C-Reactive Protein   0.44 H   (0.0-0.3)  mg/dL


 


Total Protein  6.9    (6.4-8.2)  g/dL


 


Albumin  3.3 L    (3.4-5.0)  g/dL


 


Globulin  3.6 H    (2.3-3.5)  g/dL


 


Albumin/Globulin Ratio  0.9 L    (1.2-2.2)  


 


Amylase    65  D  ()  U/L


 


Lipase    69 L  ()  U/L


 


Urine Color     


 


Urine Appearance     


 


Urine pH     (4.5-8.0)  


 


Ur Specific Gravity     (1.008-1.030)  


 


Urine Protein     (NEGATIVE)  mg/dL


 


Urine Glucose (UA)     (NEGATIVE)  mg/dL


 


Urine Ketones     (NEGATIVE)  mg/dL


 


Urine Occult Blood     (NEGATIVE)  


 


Urine Nitrite     (NEGATIVE)  


 


Urine Bilirubin     (NEGATIVE)  


 


Urine Urobilinogen     (NORMAL)  mg/dL


 


Ur Leukocyte Esterase     (NEGATIVE)  


 


Urine RBC     (0-5)  


 


Urine WBC     (0-5)  


 


Ur Epithelial Cells     


 


Amorphous Sediment     


 


Urine Bacteria     


 


Urine Mucus     











Meds: 


Medications











Generic Name Dose Route Start Last Admin





  Trade Name Freq  PRN Reason Stop Dose Admin


 


Sodium Chloride  1,000 mls @ 500 mls/hr  08/18/18 22:45  08/18/18 22:40





  Normal Saline  IV   500 mls/hr





  ASDIRECTED RED   Administration





     





     





     





     


 


Sodium Chloride  1,000 mls @ 400 mls/hr  08/18/18 23:45  08/19/18 00:39





  Normal Saline  IV   400 mls/hr





  ASDIRECTED RED   Administration





     





     





     





     


 


Sodium Chloride  10 ml  08/18/18 22:31  08/18/18 22:40





  Saline Flush  FLUSH   10 ml





  ASDIRECTED PRN   Administration





  Keep Vein Open   





     





     





     


 


Sodium Chloride  10 ml  08/18/18 23:06  08/18/18 23:24





  Saline Flush  FLUSH   10 ml





  ONETIME PRN   Administration





  PER RADIOLOGY PROTOCOL   





     





     





     














Discontinued Medications














Generic Name Dose Route Start Last Admin





  Trade Name Mc  PRN Reason Stop Dose Admin


 


Al Hydroxide/Mg Hydroxide 15  0 ml  08/19/18 00:14  08/19/18 00:23





  ml/ Lidocaine HCl 15 ml  PO  08/19/18 00:15  30 ml





  ONETIME ONE   Administration





     





     





     





     


 


Hydromorphone HCl  0.5 mg  08/18/18 22:32  08/18/18 22:39





  Dilaudid  IVPUSH  08/18/18 22:33  0.5 mg





  ONETIME ONE   Administration





     





     





     





     


 


Hydromorphone HCl  0.5 mg  08/19/18 00:13  08/19/18 00:24





  Dilaudid  IVPUSH  08/19/18 00:14  0.5 mg





  ONETIME ONE   Administration





     





     





     





     


 


Sodium Chloride  70 mls @ 3 mls/sec  08/18/18 23:06  08/18/18 23:24





  Normal Saline  IV  08/18/18 23:07  3 mls/sec





  ONETIME ONE   Administration





     





     





     





     


 


Iopamidol  65 ml  08/18/18 23:06  08/18/18 23:25





  Isovue-300 (61%)  IV   65 ml





  .AS DIRECTED PRN   Administration





  RADIOLOGY EXAM   





     





     





     


 


Lidocaine HCl  Confirm  08/19/18 00:21  08/19/18 00:24





  Xylocaine 2% Viscous  Administered  08/19/18 00:22  Not Given





  Dose   





  15 ml   





  .ROUTE   





  .STK-MED ONE   





     





     





     





     


 


Ondansetron HCl  4 mg  08/18/18 22:32  08/18/18 22:40





  Zofran  IVPUSH  08/18/18 22:33  4 mg





  ONETIME ONE   Administration





     





     





     





     


 


Pantoprazole Sodium  40 mg  08/19/18 01:13  





  Protonix Iv***  IVPUSH  08/19/18 01:14  





  ONETIME ONE   





     





     





     





     














- Re-Assessments/Exams


Free Text/Narrative Re-Assessment/Exam: 





08/19/18 01:11


cat scan did not show acute abdomanal findings.  She was noted to have a t12 

compression change of unknown date. She did have a significant fall 2 weeks ago 

and she thinks this pain started about that time. 





Departure





- Departure


Time of Disposition: 01:16


Disposition: Home, Self-Care 01


Condition: Fair


Clinical Impression: 


 Abdominal pain, Compression fracture of T12 vertebra, History of ETOH abuse








- Discharge Information


Referrals: 


German Lockett, NP [Primary Care Provider] - 


Forms:  ED Department Discharge


Care Plan Goals: 


appt for a gastro on Monday or tuesday, protonix 20mg daily , continue the 

ultracet that she has for pain, percocet 5/325 q6h for severe pain. follow up 

appt with German Awan





- My Orders


Last 24 Hours: 


My Active Orders





08/18/18 22:07


UA W/MICROSCOPIC [URIN] Urgent 





08/18/18 22:31


Sodium Chloride 0.9% [Saline Flush]   10 ml FLUSH ASDIRECTED PRN 


Saline Lock Insert [OM.PC] Routine 





08/18/18 22:45


Sodium Chloride 0.9% [Normal Saline] 1,000 ml IV ASDIRECTED 





08/18/18 22:52


Abdomen Pelvis w Cont [CT] Stat 


Chest 2V [CR] Stat 





08/18/18 23:06


Sodium Chloride 0.9% [Saline Flush]   10 ml FLUSH ONETIME PRN 





08/18/18 23:45


Sodium Chloride 0.9% [Normal Saline] 1,000 ml IV ASDIRECTED 





08/19/18 00:14


Thoracic Spine 1V [CR] Stat 














- Assessment/Plan


Last 24 Hours: 


My Active Orders





08/18/18 22:07


UA W/MICROSCOPIC [URIN] Urgent 





08/18/18 22:31


Sodium Chloride 0.9% [Saline Flush]   10 ml FLUSH ASDIRECTED PRN 


Saline Lock Insert [OM.PC] Routine 





08/18/18 22:45


Sodium Chloride 0.9% [Normal Saline] 1,000 ml IV ASDIRECTED 





08/18/18 22:52


Abdomen Pelvis w Cont [CT] Stat 


Chest 2V [CR] Stat 





08/18/18 23:06


Sodium Chloride 0.9% [Saline Flush]   10 ml FLUSH ONETIME PRN 





08/18/18 23:45


Sodium Chloride 0.9% [Normal Saline] 1,000 ml IV ASDIRECTED 





08/19/18 00:14


Thoracic Spine 1V [CR] Stat Patient

## 2022-05-25 NOTE — DISCH
FINAL DIAGNOSES:

1. Nonhealing prepyloric gastric ulcer despite medical management.

2. Contaminated intraperitoneal mesh with 3 points of adherence of small bowel with the

    mesh.

3. Several areas of adherent transverse colon to intraperitoneal mesh.

4. Limited peripheral venous access.

 

OPERATIVE PROCEDURE:  This was done on 9/10/2018.

1. Insertion of right subclavian vein triple-lumen catheter.

2. Exploratory laparotomy with lysis of extensive adhesions.

    a.     Gastric antrectomy with Niki-en-Y gastrojejunostomy.

    b.     Truncal vagotomy.

    c.     Removal of intraperitoneal mesh en bloc with 3 separate points of adherent small

     bowel.

    d.     Small bowel resection x2.

    e.     Small bowel stricturoplasty.

    f.     Repair of area of deserosalization of transverse colon.

    g.     Placement of gastrostomy tube.

    h.     Placement of Interceed mesh to limit recurrent adhesion formation.

 

On 9/12/2018, the patient had delayed primary closure of open abdominal incision.

 

SECONDARY DIAGNOSES:  Include a history of asthma, recent of the left clavicular fracture,

history of anxiety, history of hypertension, and history of alcohol abuse.

 

SUMMARY:  This is a 62-year-old female presenting with an ongoing pain related to gastric

ulcer.  She had recently undergone an upper endoscopy, which showed a large prepyloric

gastric ulcer and with continued pain.  The followup endoscopy showed no significant

healing.  This was a quite deep ulcer and the patient has had a previous history of a

perforated peptic ulcer.  Given this, decision was made to proceed with antral resection

with the above additional procedures being required intraoperatively.  Postoperatively, the

patient was __________ resumed on full liquid diet with good emptying in the stomach seen on

upper GI x-ray and will be sent home on a full liquid diet.  We will leave the triple lumen

catheter in place, flushed again with heparin prior to discharge and we will see the patient

back in 4 days, i.e., this coming Tuesday for general status check.  She will be sent home

on her usual medications plus Percocet 5/325 mg 1 to 2 tabs q.4 hours p.r.n. pain #40 and

DuoNeb p.r.n. #30.  Follow up with Dr. Jensen at [facility] on 09/18/2018 at 9:30. 10-Dec-2021